# Patient Record
Sex: MALE | Race: WHITE | Employment: FULL TIME | URBAN - METROPOLITAN AREA
[De-identification: names, ages, dates, MRNs, and addresses within clinical notes are randomized per-mention and may not be internally consistent; named-entity substitution may affect disease eponyms.]

---

## 2020-03-09 ENCOUNTER — HOSPITAL ENCOUNTER (INPATIENT)
Age: 65
LOS: 1 days | Discharge: HOME | DRG: 300 | End: 2020-03-12
Attending: EMERGENCY MEDICINE | Admitting: INTERNAL MEDICINE
Payer: COMMERCIAL

## 2020-03-09 PROBLEM — R22.42 LOCALIZED SWELLING OF LEFT LOWER EXTREMITY: Status: ACTIVE | Noted: 2020-03-09

## 2020-03-09 LAB
ANION GAP SERPL CALCULATED.3IONS-SCNC: 11 MMOL/L (ref 3–16)
BASOPHILS ABSOLUTE: 0.1 K/UL (ref 0–0.2)
BASOPHILS RELATIVE PERCENT: 0.6 %
BUN BLDV-MCNC: 16 MG/DL (ref 7–20)
C-REACTIVE PROTEIN: 47.1 MG/L (ref 0–5.1)
CALCIUM SERPL-MCNC: 9.1 MG/DL (ref 8.3–10.6)
CHLORIDE BLD-SCNC: 96 MMOL/L (ref 99–110)
CO2: 25 MMOL/L (ref 21–32)
CREAT SERPL-MCNC: 1.1 MG/DL (ref 0.8–1.3)
EOSINOPHILS ABSOLUTE: 0.4 K/UL (ref 0–0.6)
EOSINOPHILS RELATIVE PERCENT: 4.7 %
GFR AFRICAN AMERICAN: >60
GFR NON-AFRICAN AMERICAN: >60
GLUCOSE BLD-MCNC: 119 MG/DL (ref 70–99)
HCT VFR BLD CALC: 48.3 % (ref 40.5–52.5)
HEMOGLOBIN: 15.9 G/DL (ref 13.5–17.5)
LYMPHOCYTES ABSOLUTE: 0.8 K/UL (ref 1–5.1)
LYMPHOCYTES RELATIVE PERCENT: 9 %
MCH RBC QN AUTO: 28.6 PG (ref 26–34)
MCHC RBC AUTO-ENTMCNC: 32.9 G/DL (ref 31–36)
MCV RBC AUTO: 86.9 FL (ref 80–100)
MONOCYTES ABSOLUTE: 1 K/UL (ref 0–1.3)
MONOCYTES RELATIVE PERCENT: 11.2 %
NEUTROPHILS ABSOLUTE: 7 K/UL (ref 1.7–7.7)
NEUTROPHILS RELATIVE PERCENT: 74.5 %
PDW BLD-RTO: 14.2 % (ref 12.4–15.4)
PLATELET # BLD: 403 K/UL (ref 135–450)
PMV BLD AUTO: 7.9 FL (ref 5–10.5)
POTASSIUM REFLEX MAGNESIUM: 5.8 MMOL/L (ref 3.5–5.1)
POTASSIUM SERPL-SCNC: 4.3 MMOL/L (ref 3.5–5.1)
RBC # BLD: 5.56 M/UL (ref 4.2–5.9)
SEDIMENTATION RATE, ERYTHROCYTE: 2 MM/HR (ref 0–20)
SODIUM BLD-SCNC: 132 MMOL/L (ref 136–145)
WBC # BLD: 9.4 K/UL (ref 4–11)

## 2020-03-09 PROCEDURE — 99285 EMERGENCY DEPT VISIT HI MDM: CPT

## 2020-03-09 PROCEDURE — 80048 BASIC METABOLIC PNL TOTAL CA: CPT

## 2020-03-09 PROCEDURE — 86140 C-REACTIVE PROTEIN: CPT

## 2020-03-09 PROCEDURE — 87040 BLOOD CULTURE FOR BACTERIA: CPT

## 2020-03-09 PROCEDURE — 2580000003 HC RX 258: Performed by: STUDENT IN AN ORGANIZED HEALTH CARE EDUCATION/TRAINING PROGRAM

## 2020-03-09 PROCEDURE — G0378 HOSPITAL OBSERVATION PER HR: HCPCS

## 2020-03-09 PROCEDURE — 96365 THER/PROPH/DIAG IV INF INIT: CPT

## 2020-03-09 PROCEDURE — 85652 RBC SED RATE AUTOMATED: CPT

## 2020-03-09 PROCEDURE — 85025 COMPLETE CBC W/AUTO DIFF WBC: CPT

## 2020-03-09 PROCEDURE — 84132 ASSAY OF SERUM POTASSIUM: CPT

## 2020-03-09 PROCEDURE — 6360000002 HC RX W HCPCS: Performed by: STUDENT IN AN ORGANIZED HEALTH CARE EDUCATION/TRAINING PROGRAM

## 2020-03-09 RX ORDER — SODIUM CHLORIDE 0.9 % (FLUSH) 0.9 %
10 SYRINGE (ML) INJECTION PRN
Status: DISCONTINUED | OUTPATIENT
Start: 2020-03-09 | End: 2020-03-12 | Stop reason: HOSPADM

## 2020-03-09 RX ORDER — ACETAMINOPHEN 325 MG/1
650 TABLET ORAL EVERY 6 HOURS PRN
Status: DISCONTINUED | OUTPATIENT
Start: 2020-03-09 | End: 2020-03-12 | Stop reason: HOSPADM

## 2020-03-09 RX ORDER — ONDANSETRON 2 MG/ML
4 INJECTION INTRAMUSCULAR; INTRAVENOUS EVERY 6 HOURS PRN
Status: DISCONTINUED | OUTPATIENT
Start: 2020-03-09 | End: 2020-03-12 | Stop reason: HOSPADM

## 2020-03-09 RX ORDER — PROMETHAZINE HYDROCHLORIDE 25 MG/1
12.5 TABLET ORAL EVERY 6 HOURS PRN
Status: DISCONTINUED | OUTPATIENT
Start: 2020-03-09 | End: 2020-03-12 | Stop reason: HOSPADM

## 2020-03-09 RX ORDER — SODIUM CHLORIDE 0.9 % (FLUSH) 0.9 %
10 SYRINGE (ML) INJECTION EVERY 12 HOURS SCHEDULED
Status: DISCONTINUED | OUTPATIENT
Start: 2020-03-09 | End: 2020-03-12 | Stop reason: HOSPADM

## 2020-03-09 RX ORDER — ACETAMINOPHEN 650 MG/1
650 SUPPOSITORY RECTAL EVERY 6 HOURS PRN
Status: DISCONTINUED | OUTPATIENT
Start: 2020-03-09 | End: 2020-03-12 | Stop reason: HOSPADM

## 2020-03-09 RX ADMIN — VANCOMYCIN HYDROCHLORIDE 2000 MG: 10 INJECTION, POWDER, LYOPHILIZED, FOR SOLUTION INTRAVENOUS at 22:00

## 2020-03-09 ASSESSMENT — PAIN DESCRIPTION - LOCATION: LOCATION: LEG

## 2020-03-09 ASSESSMENT — PAIN DESCRIPTION - PAIN TYPE: TYPE: ACUTE PAIN

## 2020-03-09 ASSESSMENT — PAIN DESCRIPTION - DESCRIPTORS: DESCRIPTORS: DISCOMFORT

## 2020-03-09 ASSESSMENT — PAIN - FUNCTIONAL ASSESSMENT: PAIN_FUNCTIONAL_ASSESSMENT: ACTIVITIES ARE NOT PREVENTED

## 2020-03-09 ASSESSMENT — PAIN SCALES - GENERAL: PAINLEVEL_OUTOF10: 3

## 2020-03-09 ASSESSMENT — PAIN DESCRIPTION - PROGRESSION: CLINICAL_PROGRESSION: NOT CHANGED

## 2020-03-09 ASSESSMENT — PAIN DESCRIPTION - FREQUENCY: FREQUENCY: INTERMITTENT

## 2020-03-09 ASSESSMENT — PAIN DESCRIPTION - ORIENTATION: ORIENTATION: LEFT

## 2020-03-09 ASSESSMENT — PAIN DESCRIPTION - ONSET: ONSET: ON-GOING

## 2020-03-10 PROBLEM — L20.9 ATOPIC ECZEMA: Status: ACTIVE | Noted: 2020-03-10

## 2020-03-10 PROBLEM — L03.116 LEFT LEG CELLULITIS: Status: ACTIVE | Noted: 2020-03-10

## 2020-03-10 LAB
ANION GAP SERPL CALCULATED.3IONS-SCNC: 9 MMOL/L (ref 3–16)
BUN BLDV-MCNC: 16 MG/DL (ref 7–20)
CALCIUM SERPL-MCNC: 8.4 MG/DL (ref 8.3–10.6)
CHLORIDE BLD-SCNC: 100 MMOL/L (ref 99–110)
CO2: 25 MMOL/L (ref 21–32)
CREAT SERPL-MCNC: 0.9 MG/DL (ref 0.8–1.3)
GFR AFRICAN AMERICAN: >60
GFR NON-AFRICAN AMERICAN: >60
GLUCOSE BLD-MCNC: 118 MG/DL (ref 70–99)
HCT VFR BLD CALC: 43.8 % (ref 40.5–52.5)
HEMOGLOBIN: 14.5 G/DL (ref 13.5–17.5)
MCH RBC QN AUTO: 28.6 PG (ref 26–34)
MCHC RBC AUTO-ENTMCNC: 33 G/DL (ref 31–36)
MCV RBC AUTO: 86.5 FL (ref 80–100)
PDW BLD-RTO: 13.8 % (ref 12.4–15.4)
PLATELET # BLD: 356 K/UL (ref 135–450)
PMV BLD AUTO: 7.6 FL (ref 5–10.5)
POTASSIUM REFLEX MAGNESIUM: 4.3 MMOL/L (ref 3.5–5.1)
RBC # BLD: 5.06 M/UL (ref 4.2–5.9)
SODIUM BLD-SCNC: 134 MMOL/L (ref 136–145)
WBC # BLD: 8.8 K/UL (ref 4–11)

## 2020-03-10 PROCEDURE — 96372 THER/PROPH/DIAG INJ SC/IM: CPT

## 2020-03-10 PROCEDURE — 96367 TX/PROPH/DG ADDL SEQ IV INF: CPT

## 2020-03-10 PROCEDURE — G0378 HOSPITAL OBSERVATION PER HR: HCPCS

## 2020-03-10 PROCEDURE — 85027 COMPLETE CBC AUTOMATED: CPT

## 2020-03-10 PROCEDURE — 36415 COLL VENOUS BLD VENIPUNCTURE: CPT

## 2020-03-10 PROCEDURE — 97116 GAIT TRAINING THERAPY: CPT

## 2020-03-10 PROCEDURE — 99254 IP/OBS CNSLTJ NEW/EST MOD 60: CPT | Performed by: INTERNAL MEDICINE

## 2020-03-10 PROCEDURE — 97161 PT EVAL LOW COMPLEX 20 MIN: CPT

## 2020-03-10 PROCEDURE — 96376 TX/PRO/DX INJ SAME DRUG ADON: CPT

## 2020-03-10 PROCEDURE — 96366 THER/PROPH/DIAG IV INF ADDON: CPT

## 2020-03-10 PROCEDURE — 6360000002 HC RX W HCPCS: Performed by: INTERNAL MEDICINE

## 2020-03-10 PROCEDURE — 2580000003 HC RX 258: Performed by: INTERNAL MEDICINE

## 2020-03-10 PROCEDURE — 80048 BASIC METABOLIC PNL TOTAL CA: CPT

## 2020-03-10 PROCEDURE — 96375 TX/PRO/DX INJ NEW DRUG ADDON: CPT

## 2020-03-10 PROCEDURE — 6370000000 HC RX 637 (ALT 250 FOR IP): Performed by: INTERNAL MEDICINE

## 2020-03-10 RX ORDER — TRIAMCINOLONE ACETONIDE 5 MG/G
CREAM TOPICAL 2 TIMES DAILY
Status: DISCONTINUED | OUTPATIENT
Start: 2020-03-10 | End: 2020-03-10

## 2020-03-10 RX ORDER — DIPHENHYDRAMINE HYDROCHLORIDE 50 MG/ML
25 INJECTION INTRAMUSCULAR; INTRAVENOUS EVERY 6 HOURS PRN
Status: DISCONTINUED | OUTPATIENT
Start: 2020-03-10 | End: 2020-03-12 | Stop reason: HOSPADM

## 2020-03-10 RX ORDER — DIPHENHYDRAMINE HYDROCHLORIDE 50 MG/ML
25 INJECTION INTRAMUSCULAR; INTRAVENOUS ONCE
Status: COMPLETED | OUTPATIENT
Start: 2020-03-10 | End: 2020-03-10

## 2020-03-10 RX ADMIN — VANCOMYCIN HYDROCHLORIDE 1250 MG: 10 INJECTION, POWDER, LYOPHILIZED, FOR SOLUTION INTRAVENOUS at 23:09

## 2020-03-10 RX ADMIN — CEFAZOLIN SODIUM 2 G: 10 INJECTION, POWDER, FOR SOLUTION INTRAVENOUS at 17:15

## 2020-03-10 RX ADMIN — DIPHENHYDRAMINE HYDROCHLORIDE 25 MG: 50 INJECTION, SOLUTION INTRAMUSCULAR; INTRAVENOUS at 04:46

## 2020-03-10 RX ADMIN — ENOXAPARIN SODIUM 40 MG: 40 INJECTION SUBCUTANEOUS at 08:47

## 2020-03-10 RX ADMIN — Medication 10 ML: at 10:59

## 2020-03-10 RX ADMIN — DIPHENHYDRAMINE HYDROCHLORIDE 25 MG: 50 INJECTION, SOLUTION INTRAMUSCULAR; INTRAVENOUS at 02:34

## 2020-03-10 RX ADMIN — VANCOMYCIN HYDROCHLORIDE 1250 MG: 10 INJECTION, POWDER, LYOPHILIZED, FOR SOLUTION INTRAVENOUS at 10:58

## 2020-03-10 RX ADMIN — TRIAMCINOLONE ACETONIDE: 1 OINTMENT TOPICAL at 21:21

## 2020-03-10 ASSESSMENT — PAIN SCALES - GENERAL: PAINLEVEL_OUTOF10: 0

## 2020-03-10 NOTE — ED NOTES
Pt calm and resting quietly with equal rise and fall of chest. Pt in NAD, RR even and unlabored. Side rails up, bed locked and in lowest position. Pt updated on plan of care. No needs at this time. Pt instructed on use of call light if needed.       Boy Patino RN  03/09/20 5141

## 2020-03-10 NOTE — ED PROVIDER NOTES
Consideration of SJS/JAN in versus staph scalded skin was made, but the patient's vital signs and long evolution argue against these emergent diagnoses. Patient denies having any hardware, but underlying osteomyelitis is also a consideration given the duration. He does not have considerable pain and given the length of evolution, I believe that a necrotizing infection is unlikely. I also considered the possibility of cerulea phlegmasia dolerans patient. He informed us that he had a previous duplex study at an outside hospital during the course of this disease that was negative. At this time basic laboratory test including CBC, BMP, ESR, CRP are being pulled, I am pulling blood cultures x2. Testing was elevated but hemolyzed, and was resent. In addition I am starting the patient empirically on vancomycin and admitting the patient to the hospitalish for further evaluation. ED Course as of Mar 09 2245   Mon Mar 09, 2020   2040 BP(!): 151/76 [BB]   2040 Pulse: 92 [BB]   2040 Resp: 16 [BB]   2040 SpO2: 100 % [BB]   2040 Temp: 97.8 °F (36.6 °C) [BB]   2040 MAP (mmHg): 98 [BB]   2147 BP(!): 151/76 [BB]   2147 Pulse: 92 [BB]   2147 Resp: 16 [BB]   2147 Temp: 97.8 °F (36.6 °C) [BB]   2147 SpO2: 100 % [BB]   2157 BP(!): 151/76 [BB]   2158 Pulse: 92 [BB]   2158 Resp: 16 [BB]   2158 SpO2: 100 % [BB]   2158 Temp: 97.8 °F (36.6 °C) [BB]   2218 WBC: 9.4 [BB]   2219 BP: 134/73 [BB]   2219 SpO2: 99 % [BB]      ED Course User Index  [BB] Drake Hernandez MD       This patient was also evaluated by the attending physician. All care plans werediscussed and agreed upon. Clinical Impression     1. Rash and other nonspecific skin eruption        Disposition     PATIENT REFERRED TO:  No follow-up provider specified.     DISCHARGE MEDICATIONS:  New Prescriptions    No medications on file       Sadie Ruiz MD  Resident  03/09/20 1977

## 2020-03-10 NOTE — CONSULTS
Clinical Pharmacy Consult Note    Admit date: 3/9/2020    Subjective/Objective:    61 yo male with no significant past medical history on file presented with complaint of localized left lower extremity swelling with pain and rash which has worsened over the past 3 to 4 weeks after failure of outpatient antibiotic therapy obtained from a local Urgent Care. Due to concern for suspected cellulitis versus allergic ischemia eruption, Pharmacy is consulted to empirically dose Vancomycin per Dr. Nico Aceves. Pertinent Medications:  Vancomycin IV; Pharmacy to dose -- day # 2   2000mg IV x1 (3/9 2200)   1250mg IV q12h (3/10- current)    PERTINENT LABS:  Recent Labs     03/09/20 2146 03/09/20  2249 03/10/20  0609   *  --  134*   K 5.8* 4.3 4.3   CL 96*  --  100   CO2 25  --  25   BUN 16  --  16   CREATININE 1.1  --  0.9       Estimated Creatinine Clearance: 87 mL/min (based on SCr of 0.9 mg/dL). Recent Labs     03/09/20  2146 03/10/20  0609   WBC 9.4 8.8   HGB 15.9 14.5   HCT 48.3 43.8   MCV 86.9 86.5    356       Height:  5' 7\" (170.2 cm)  Weight: 185 lb 6.5 oz (84.1 kg)    Micro:  Date Site Micro Susceptibility   3/9/20 Blood Pending                    Assessment/Plan:  Vancomycin  · Indication: cellulitis versus allergic ischemia eruption  · Trough goal 10-15  · Agree with current regimen of 1250mg IV q12h   · Patient developed itching and flushing with 2000mg dose on 3/9, will modify infusion rate to be given over 2 hours. PRN benadryl ordered q6h   · Will obtain trough tomorrow 3/11 prior to 4th dose   · Renal function will be monitored closely and dosing will be adjusted as appropriate. Please call with any questions. Thank you for consulting pharmacy!   Adam Woodruff, PharmD, [address] [place] [address]: [phone]  [address] [place] [address] [employer]: [phone]  3/10/2020 10:00 AM

## 2020-03-10 NOTE — PROGRESS NOTES
4 Eyes Admission Assessment     I agree as the admission nurse that 2 RN's have performed a thorough Head to Toe Skin Assessment on the patient. ALL assessment sites listed below have been assessed on admission. Areas assessed by both nurses:   [x]   Head, Face, and Ears   [x]   Shoulders, Back, and Chest  [x]   Arms, Elbows, and Hands   [x]   Coccyx, Sacrum, and Ischum  [x]   Legs, Feet, and Heels        Does the Patient have Skin Breakdown?   No  Rash to BUE/Chest/Abdomen/RLE  Redness/swelling to LLE        Micah Prevention initiated:  Yes   Wound Care Orders initiated:  NA      [facility] nurse consulted for Pressure Injury (Stage 3,4, Unstageable, DTI, NWPT, and Complex wounds):  NA      Nurse 1 eSignature: Electronically signed by Geronimo Giron RN on 3/10/20 at 2:07 AM EDT    **SHARE this note so that the co-signing nurse is able to place an eSignature**    Nurse 2 eSignature: Electronically signed by Chuck Nicholas RN on 3/10/20 at 2:08 AM EDT

## 2020-03-10 NOTE — CONSULTS
Clinical Pharmacy Consult Note    Admit date: 3/9/2020    Subjective/Objective:    61 yo male with no significant past medical history on file presented with complaint of localized left lower extremity swelling with pain and rash which has worsened over the past 3 to 4 weeks after failure of outpatient antibiotic therapy obtained from a local Urgent Care. Due to concern for suspected cellulitis versus allergic ischemia eruption, Pharmacy is consulted to empirically dose Vancomycin per Dr. Ashia Lu. Pertinent Medications:  Vancomycin IV; Pharmacy to dose -- day # 1    PERTINENT LABS:  Recent Labs     03/09/20 2146 03/09/20  2249   *  --    K 5.8* 4.3   CL 96*  --    CO2 25  --    BUN 16  --    CREATININE 1.1  --        Estimated Creatinine Clearance: 67 mL/min (based on SCr of 1.1 mg/dL). Recent Labs     03/09/20 2146   WBC 9.4   HGB 15.9   HCT 48.3   MCV 86.9          Height:  5' 6\" (167.6 cm)  Weight: 171 lb 11.2 oz (77.9 kg)    Micro:  Date Site Micro Susceptibility   3/9/20 Blood Pending                    Assessment/Plan:  Vancomycin  · Prior to this consult, patient received a dose of vancomycin IV 2000 mg. Will continue therapy with vancomycin 1250 mg every 12 hours (~ 15 mg/kg)  · Clinical pharmacist will follow-up in AM.  · Renal function will be monitored closely and dosing will be adjusted as appropriate. Please call with any questions. Thank you for consulting pharmacy!   Lorenzo Jensen Rph  3/9/2020 11:22 PM

## 2020-03-10 NOTE — PROGRESS NOTES
Patient admitted to room 5511 at [phone] from ED. Patient is a/o x4. Patient denies complaints of nausea/pain. Non-skid socks given. Oriented patient to room and call light. Bed locked and in lowest positioned. Bedside table, personal belongings, and nurse call light within reach. Instructed patient to utilize call light for assistance. Bed alarm on. Will continue to monitor.

## 2020-03-10 NOTE — CARE COORDINATION
8: 30am-5pm  2. Prescription(s) must be in pharmacy by 3pm to be filled same day  3. Copy of patient's insurance/ prescription drug card and patient face sheet must be sent along with the prescription(s)  4. Cost of Rx cannot be added to hospital bill. If financial assistance is needed, please contact unit  or ;  or  CANNOT provide pharmacy voucher for patients co-pays  5. Patients can then  the prescription on their way out of the hospital at discharge, or pharmacy can deliver to the bedside if staff is available. (payment due at time of pick-up or delivery - cash, check, or card accepted)     Able to afford home medications/ co-pay costs: Yes    ADLS  Support Systems: Spouse/Significant Other, Children    PT AM-PAC: 22 /24  OT AM-PAC:   /24    [place]: one level apartment  Steps: 2 flights    Plans to RETURN to current housing: Yes  Barriers to RETURNING to current housing: none noted    DISCHARGE PLAN:  Disposition: Home- No Services Needed    Transportation PLAN for discharge: self     Factors facilitating achievement of predicted outcomes: Cooperative and Pleasant    Barriers to discharge: awaiting dermatology    Additional Case Management Notes: NA    The Plan for Transition of Care is related to the following treatment goals of Localized swelling of left lower extremity [R22.42]  Localized swelling of left lower extremity [R22.42]    The Patient and/or patient representative Flores Tinoco and his family were provided with a choice of provider and agrees with the discharge plan Not Indicated    Freedom of choice list was provided with basic dialogue that supports the patient's individualized plan of care/goals and shares the quality data associated with the providers.  Not Indicated      Care Transition patient: No    Rebecca Glynn RN  [facility] [employer]  Case Management Department  Ph: [phone]   Fax: [phone]

## 2020-03-10 NOTE — CONSULTS
Penicillins    Social History:    TOBACCO:    None   ETOH:    Occasional   DRUGS:   None   MARITAL STATUS:      OCCUPATION:   Unknown     Family History:   No immunodeficiency    REVIEW OF SYSTEMS:    No fever / chills / sweats. No weight loss. No visual change, eye pain, eye discharge. No oral lesion, sore throat, dysphagia. Denies cough / sputum. Denies chest pain, palpitations. Denies n / v / abd pain. No diarrhea. Denies dysuria or change in urinary function. Left lower extremity swelling, rash. B/l upper extremity rash. Denies focal weakness, sensory change or other neurologic symptom  No lymph node swelling or tenderness. Tolerating antibiotics. PHYSICAL EXAM:      Vitals:    /63   Pulse 81   Temp 98.7 °F (37.1 °C) (Oral)   Resp 16   Ht 5' 7\" (1.702 m)   Wt 185 lb 6.5 oz (84.1 kg)   SpO2 99%   BMI 29.04 kg/m²     GENERAL: No apparent distress. HEENT: Membranes moist, no conjunctival changes, no oral lesion  NECK:  Supple, no masses  LYMPH: No adenopathy   LUNGS: Clear b/l, no rales, no dullness  CARDIAC: RRR, no murmur appreciated  ABD:  + BS, soft / NT, no masses  EXT:  LLE: Swollen, erythematous, scaling. No wounds. Full ROM. RLE: Maculopapular rash on anterior thigh    R/L UE: Scaling erythematous rash in antecubital spaces. No   wounds. Full ROM.   NEURO: No focal neurologic findings  PSYCH: Orientation, sensorium, mood normal  LINES:      DATA:    Lab Results   Component Value Date    WBC 8.8 03/10/2020    HGB 14.5 03/10/2020    HCT 43.8 03/10/2020    MCV 86.5 03/10/2020     03/10/2020     Lab Results   Component Value Date    CREATININE 0.9 03/10/2020    BUN 16 03/10/2020     (L) 03/10/2020    K 4.3 03/10/2020     03/10/2020    CO2 25 03/10/2020       Micro:  3/9 BC sent    Imaging:   None     IMPRESSION:      Patient Active Problem List   Diagnosis    Localized swelling of left lower extremity RECOMMENDATIONS:  Cellulitis  -Cellulitis likely superimposed on underlying dermatological disease  -Continue Vancomycin  -Add Cefazolin  -Dermatology consulted    Discussed with pt  Dr. Jackie Sibley    Addendum to Resident Consult Note:  Pt seen, examined and evaluated. I have reviewed the current history, physical findings, labs and assessment and plan and agree with note as documented by resident (Dr. Akosua Perez). 61 yo man with no previous established medical care - no dx / meds  Hx rash as child   Intermittent flare of rash, involves flexure area of arm    Strained L leg in Oct, developed erythema, worse recently. Seen at Urgent Care, 'little better' with topical steroid and antibiotic. Worsened, return to Urgent Care and referred to [facility] ED. At [facility] 3/9 - afebrile, WBC 9.4. Admit, started on vancomycin    Upper extremity      Lower extremity        IMP/  Probable underlying atopic dermatitis  L leg cellulitis, poss Streptococcal    REC/  Cont vancomycin  Start cefazolin  Confer with Derm   - topical steroid   - wet wrap, UV light, systemic therapy (cyclosporin, methotrexate, azathiaprine)    Needs f/u with Derm  Discussed with pt   Brenda Vergara MD     ADDENDUM:  Ashley Man, Dr Kerry Ramirez. He reviewed images in Epic. Agrees with dx.   Recommends triamcinolone ointment bid   Pt can f/u with him after discharge  Brenda Vergara MD

## 2020-03-10 NOTE — PROGRESS NOTES
Physical Therapy    Facility/Department: [facility] 5T ORTHO/NEURO  Initial Assessment and Treatment/Discharge    NAME: Lucy Anderson  : 1955  MRN: [id number]    Date of Service: 3/10/2020    Discharge Recommendations:Daniel Dennis scored a 22/24 on the AM-PAC short mobility form. Current research shows that an AM-PAC score of 18 or greater is typically associated with a discharge to the patient's home setting. Based on the patients AM-PAC score and their current functional mobility deficits, it is recommended that the patient have 2-3 sessions per week of Physical Therapy at d/c to increase the patients independence. SEE ASSESSMENT BELOW    If patient discharges prior to next session this note will serve as a discharge summary. Please see below for the latest assessment towards goals. PT Equipment Recommendations  Equipment Needed: No    Assessment   Assessment: Pt demonstrating safe mobility without AD with supervision. LLE ROM limited by pain and swelling. Plans to go home at d/c and has no concerns about managing. Pt instructed on performing ROM LLE and to ambulate while in hospital to maintain independence - pt verbalized understanding. No further PT needs identified at this time  Decision Making: Low Complexity  PT Education: Goals;PT Role;Plan of Care;General Safety; Functional Mobility Training  Patient Education: Pt verbalized understanding  REQUIRES PT FOLLOW UP: No       Patient Diagnosis(es): The encounter diagnosis was Rash and other nonspecific skin eruption. has no past medical history on file. has no past surgical history on file. Restrictions  Position Activity Restriction  Other position/activity restrictions: up as tolerated  Vision/Hearing  Vision: Within Functional Limits  Hearing: Within functional limits     Subjective  General  Chart Reviewed: Yes  Additional Pertinent Hx: Pt is a 62 y.o. male adm 3/9 with swelling LLE.   Presented to ED with a 4-month evolution of a left lower leg swelling and redness. No significant PMH  Referral Date : 03/09/20  Subjective  Subjective: Pt found supine. Agreeable to PT  Pain Screening  Patient Currently in Pain: Yes(LLE, not rated, RN aware)  Vital Signs  Patient Currently in Pain: Yes(LLE, not rated, RN aware)       Orientation  Orientation  Overall Orientation Status: Within Functional Limits  Social/Functional History  Social/Functional History  Lives With: Alone(wife lives in the [place])  Type of Home: Apartment  Home Layout: One level  Home Access: Stairs to enter with rails(2 flights of steps)  Bathroom Shower/Tub: Tub/Shower unit  Bathroom Toilet: Standard(toilet is between tub and sink)  Home Equipment: (none)  ADL Assistance: Independent  Homemaking Assistance: Independent  Ambulation Assistance: Independent  Transfer Assistance: Independent  Active : Yes  Type of occupation:  at [employer]  Additional Comments: Has been having trouble with LLE since November. Denies falls  Cognition        Objective          AROM RLE (degrees)  RLE AROM: WFL  AROM LLE (degrees)  LLE AROM : WFL(grossly decreased 2/2 swelling but WFL - knee flexion to approximately 90 degrees)  LLE General AROM: swollen, erythematous, warm left leg from the base of the toes through the upper thigh  Strength RLE  Strength RLE: WFL  Strength LLE  Strength LLE: WFL        Bed mobility  Supine to Sit: Supervision(HOB elevated)  Transfers  Sit to Stand: Supervision  Stand to sit: Supervision  Ambulation  Ambulation?: Yes  Ambulation 1  Device: No Device  Assistance: Supervision  Quality of Gait: L knee flexed with heel off floor in stance phase, decreased stance time LLE, steady, decreased davis  Distance: 30', 20'   Stairs/Curb  Stairs?: (discussed technique on steps - pt reports this is what he has been doing, has no concerns about managing)        Exercises  Comments: Discussed performing ROM exercises LLE hip/knee/ankle. Pt verbalized understanding   Treatment included: bed mobility, transfers, gt, pt education    Plan   Plan  Times per week: D/C PT   Safety Devices  Type of devices: Call light within reach, Chair alarm in place, Nurse notified, Left in chair    G-Code       OutComes Score                                                  AM-PAC Score  AM-PAC Inpatient Mobility Raw Score : 22 (03/10/20 1148)  AM-PAC Inpatient T-Scale Score : 53.28 (03/10/20 1148)  Mobility Inpatient CMS 0-100% Score: 20.91 (03/10/20 1148)  Mobility Inpatient CMS G-Code Modifier : Hansksenia Moses (03/10/20 1148)          Goals  Short term goals  Time Frame for Short term goals: No goals set - pt supervision with mobility       Therapy Time   Individual Concurrent Group Co-treatment   Time In 1129         Time Out 1153         Minutes 24              Timed Code Treatment Minutes: 9      Total Treatment Minutes:  550 Sonja Ma, PT

## 2020-03-10 NOTE — H&P
Hospital Medicine History & Physical      PCP: No primary care provider on file. Date of Admission: 3/9/2020    Chief Complaint:  LLE rash      History Of Present Illness:  Patient is a 46-year-old male who denies any past medical history presents to the hospital for 4-month left lower extremity swelling and redness. According to the patient he does not visit doctors and he thought it would resolve on its own. He mentions meanwhile he visited urgent care once and received antibiotics but his rash has continued to get worse and now associated with pain. He mentions his rash has gotten bigger in size. He denies associated fever chest pain shortness of breath nausea vomiting chills. Past Medical History:      No past medical history on file. Past Surgical History:      No past surgical history on file. Medications Prior to Admission:      Prior to Admission medications    Not on File       Allergies:  Penicillins    Social History:      TOBACCO:   reports that he has never smoked. He has never used smokeless tobacco.  ETOH:   has no history on file for alcohol. Family History:       M - HTN      No family history on file. REVIEW OF SYSTEMS:   Pertinent positives as noted in the HPI. All other systems reviewed and negative. PHYSICAL EXAM PERFORMED:    /73   Pulse 85   Temp 98.4 °F (36.9 °C) (Oral)   Resp 16   Ht 5' 7\" (1.702 m)   Wt 185 lb 6.5 oz (84.1 kg)   SpO2 100%   BMI 29.04 kg/m²     General appearance:  No apparent distress, cooperative. HEENT:  Normal cephalic, atraumatic without obvious deformity. Conjunctivae/corneas clear. Neck: Supple, with full range of motion. No cervical lymphadenopathy  Respiratory:  Normal respiratory effort. Clear to auscultation, bilaterally without Rales/Wheezes/Rhonchi. Cardiovascular:  Regular rate and rhythm with normal S1/S2 without murmurs, rubs or gallops.   Abdomen: Soft, non-tender, non-distended, normal bowel sounds. Musculoskeletal:  No edema bilaterally. No tenderness on palpation   Skin: Diffuse circumferential left lower extremity rash, he also has rash on bilateral flexor upper extremities  Neurologic:  Neurologically intact without any focal sensory/motor deficits. grossly non-focal.  Psychiatric:  Alert and oriented, normal mood  Peripheral Pulses: +2 palpable, equal bilaterally       Labs:     Recent Labs     03/09/20 2146   WBC 9.4   HGB 15.9   HCT 48.3        Recent Labs     03/09/20 2146 03/09/20  2249   *  --    K 5.8* 4.3   CL 96*  --    CO2 25  --    BUN 16  --    CREATININE 1.1  --    CALCIUM 9.1  --      No results for input(s): AST, ALT, BILIDIR, BILITOT, ALKPHOS in the last 72 hours. No results for input(s): INR in the last 72 hours. No results for input(s): Rubin Cowan in the last 72 hours. Urinalysis:    No results found for: Domenica Flores, BACTERIA, [address] [facility], [facility], [facility], [facility] 994    Radiology:       No orders to display           Active Hospital Problems    Diagnosis Date Noted    Localized swelling of left lower extremity [R22.42] 03/09/2020     Patient is a 50-year-old male who denies any past medical history presents to the hospital for 4-month left lower extremity swelling and redness. According to the patient he does not visit doctors and he thought it would resolve on its own. He mentions meanwhile he visited urgent care once and received antibiotics but his rash has continued to get worse and now associated with pain. He mentions his rash has gotten bigger in size. He denies associated fever chest pain shortness of breath nausea vomiting chills.     Assessment  Left lower extremity rash secondary to unclear etiology, suspected cellulitis versus allergic ischemia eruption  Elevated CRP  Hyponatremia    Plan  We will start empirical IV vancomycin, consider ID consult if no improvement, consider dermatology consult  IV fluid hydration  Continue to monitor CBC, BMP  Blood cultures were ordered from ED  DVT Prophylaxis: lovenox  Diet: DIET GENERAL;  Code Status: Full Code    PT/OT Eval Status: ordered for one time eval    Dispo - 24-48hrs, home       James Lloyd MD    Thank you No primary care provider on file. for the opportunity to be involved in this patient's care. If you have any questions or concerns please feel free to contact me at [phone].

## 2020-03-11 ENCOUNTER — APPOINTMENT (OUTPATIENT)
Dept: VASCULAR LAB | Age: 65
DRG: 300 | End: 2020-03-11
Payer: COMMERCIAL

## 2020-03-11 LAB — VANCOMYCIN TROUGH: 12.8 UG/ML (ref 10–20)

## 2020-03-11 PROCEDURE — 93971 EXTREMITY STUDY: CPT

## 2020-03-11 PROCEDURE — 96372 THER/PROPH/DIAG INJ SC/IM: CPT

## 2020-03-11 PROCEDURE — 6360000002 HC RX W HCPCS: Performed by: INTERNAL MEDICINE

## 2020-03-11 PROCEDURE — G0378 HOSPITAL OBSERVATION PER HR: HCPCS

## 2020-03-11 PROCEDURE — 80202 ASSAY OF VANCOMYCIN: CPT

## 2020-03-11 PROCEDURE — 96366 THER/PROPH/DIAG IV INF ADDON: CPT

## 2020-03-11 PROCEDURE — 2580000003 HC RX 258: Performed by: INTERNAL MEDICINE

## 2020-03-11 PROCEDURE — 6370000000 HC RX 637 (ALT 250 FOR IP): Performed by: INTERNAL MEDICINE

## 2020-03-11 PROCEDURE — 99232 SBSQ HOSP IP/OBS MODERATE 35: CPT | Performed by: INTERNAL MEDICINE

## 2020-03-11 PROCEDURE — 36415 COLL VENOUS BLD VENIPUNCTURE: CPT

## 2020-03-11 RX ADMIN — CEFAZOLIN SODIUM 2 G: 10 INJECTION, POWDER, FOR SOLUTION INTRAVENOUS at 23:55

## 2020-03-11 RX ADMIN — TRIAMCINOLONE ACETONIDE: 1 OINTMENT TOPICAL at 08:36

## 2020-03-11 RX ADMIN — ENOXAPARIN SODIUM 40 MG: 40 INJECTION SUBCUTANEOUS at 08:33

## 2020-03-11 RX ADMIN — CEFAZOLIN SODIUM 2 G: 10 INJECTION, POWDER, FOR SOLUTION INTRAVENOUS at 16:56

## 2020-03-11 RX ADMIN — CEFAZOLIN SODIUM 2 G: 10 INJECTION, POWDER, FOR SOLUTION INTRAVENOUS at 03:40

## 2020-03-11 RX ADMIN — VANCOMYCIN HYDROCHLORIDE 1250 MG: 10 INJECTION, POWDER, LYOPHILIZED, FOR SOLUTION INTRAVENOUS at 12:20

## 2020-03-11 RX ADMIN — ENOXAPARIN SODIUM 80 MG: 80 INJECTION SUBCUTANEOUS at 22:14

## 2020-03-11 RX ADMIN — TRIAMCINOLONE ACETONIDE: 1 OINTMENT TOPICAL at 22:08

## 2020-03-11 RX ADMIN — Medication 10 ML: at 08:37

## 2020-03-11 RX ADMIN — CEFAZOLIN SODIUM 2 G: 10 INJECTION, POWDER, FOR SOLUTION INTRAVENOUS at 08:24

## 2020-03-11 RX ADMIN — Medication 10 ML: at 00:55

## 2020-03-11 RX ADMIN — Medication 10 ML: at 22:09

## 2020-03-11 ASSESSMENT — PAIN SCALES - GENERAL
PAINLEVEL_OUTOF10: 2
PAINLEVEL_OUTOF10: 0

## 2020-03-11 ASSESSMENT — PAIN DESCRIPTION - LOCATION: LOCATION: KNEE

## 2020-03-11 ASSESSMENT — PAIN DESCRIPTION - PAIN TYPE: TYPE: ACUTE PAIN

## 2020-03-11 NOTE — PROGRESS NOTES
Pt admitted to room 6313 from 3S. Pt alert and oriented. Up ad rod with steady gait. Call light in reach.

## 2020-03-11 NOTE — PROGRESS NOTES
He is alert and oriented to person, place, and time. Psychiatric:         Mood and Affect: Mood normal.         Behavior: Behavior normal.               Labs      Recent Labs     03/09/20  2146 03/10/20  0609   WBC 9.4 8.8   HGB 15.9 14.5   HCT 48.3 43.8    356   MCV 86.9 86.5        Recent Labs     03/09/20  2146 03/09/20  2249 03/10/20  0609   *  --  134*   K 5.8* 4.3 4.3   CL 96*  --  100   CO2 25  --  25   BUN 16  --  16   CREATININE 1.1  --  0.9       No results for input(s): AST, ALT, ALB, BILIDIR, BILITOT, ALKPHOS in the last 72 hours. No results for input(s): MG in the last 72 hours. Radiology  No orders to display           Assessment and Plan: Active Problems:    Localized swelling of left lower extremity    Atopic eczema    Left leg cellulitis    Rash and other nonspecific skin eruption  Resolved Problems:    * No resolved hospital problems. *       Left lower extremity cellulitis  Continue with IV antibiotics  Likely streptococcal infection. Infectious disease following. Venous dopplers. Atopic eczema  Continue with the topical steroid. Needs outpatient dermatology.           Joseph Magaña MD  3/11/2020

## 2020-03-11 NOTE — PROGRESS NOTES
magnesium hydroxide, promethazine **OR** ondansetron      Assessment:     Cellulitis of LLE likely superimposed on underlying atopic dermatitis      Plan:     Continue vanc/cefazolin  Continue triamcinolone ointment  Follow with Dermatology (Dr. Beto Wilkins) as outpatient    Discussed with   Fabienne Machuca MD    Addendum to Resident Progress Note:  Pt seen, examined and evaluated. I have reviewed the current history, physical findings, labs and assessment and plan and agree with note as documented by resident (Dr. Aditi Lara).    61 yo man with no previous established medical care - no dx / meds  Hx rash as child   Intermittent flare of rash, involves flexure area of arm     Strained L leg in Oct, developed erythema, worse recently. Seen at Urgent Care, 'little better' with topical steroid and antibiotic. Worsened, return to Urgent Care and referred to [facility] ED.     At [facility] 3/9 - afebrile, WBC 9.4.   Admit, started on vancomycin     Upper extremity - 3/10       Lower extremity - 3/10          IMP/  Probable underlying atopic dermatitis  L leg cellulitis, poss Streptococcal     REC/  Cont vancomycin + cefazolin  Topical steroid - triamcinolone oitment bid    F/u Derm post-discharge      Discussed with pt   Shital Chavez MD

## 2020-03-12 VITALS
TEMPERATURE: 97.9 F | DIASTOLIC BLOOD PRESSURE: 63 MMHG | HEIGHT: 67 IN | BODY MASS INDEX: 29.1 KG/M2 | WEIGHT: 185.41 LBS | HEART RATE: 67 BPM | OXYGEN SATURATION: 97 % | SYSTOLIC BLOOD PRESSURE: 117 MMHG | RESPIRATION RATE: 16 BRPM

## 2020-03-12 PROBLEM — L03.116 CELLULITIS OF LEFT LEG: Status: ACTIVE | Noted: 2020-03-12

## 2020-03-12 PROCEDURE — G0378 HOSPITAL OBSERVATION PER HR: HCPCS

## 2020-03-12 PROCEDURE — 2580000003 HC RX 258: Performed by: INTERNAL MEDICINE

## 2020-03-12 PROCEDURE — 6360000002 HC RX W HCPCS: Performed by: INTERNAL MEDICINE

## 2020-03-12 PROCEDURE — 96366 THER/PROPH/DIAG IV INF ADDON: CPT

## 2020-03-12 PROCEDURE — 99232 SBSQ HOSP IP/OBS MODERATE 35: CPT | Performed by: INTERNAL MEDICINE

## 2020-03-12 PROCEDURE — 2060000000 HC ICU INTERMEDIATE R&B

## 2020-03-12 RX ORDER — CLINDAMYCIN HYDROCHLORIDE 300 MG/1
300 CAPSULE ORAL 3 TIMES DAILY
Qty: 30 CAPSULE | Refills: 0 | Status: SHIPPED | OUTPATIENT
Start: 2020-03-12 | End: 2020-03-20 | Stop reason: SDUPTHER

## 2020-03-12 RX ADMIN — CEFAZOLIN SODIUM 2 G: 10 INJECTION, POWDER, FOR SOLUTION INTRAVENOUS at 09:40

## 2020-03-12 RX ADMIN — VANCOMYCIN HYDROCHLORIDE 1250 MG: 10 INJECTION, POWDER, LYOPHILIZED, FOR SOLUTION INTRAVENOUS at 13:06

## 2020-03-12 RX ADMIN — ENOXAPARIN SODIUM 80 MG: 80 INJECTION SUBCUTANEOUS at 09:40

## 2020-03-12 RX ADMIN — VANCOMYCIN HYDROCHLORIDE 1250 MG: 10 INJECTION, POWDER, LYOPHILIZED, FOR SOLUTION INTRAVENOUS at 01:07

## 2020-03-12 RX ADMIN — TRIAMCINOLONE ACETONIDE: 1 OINTMENT TOPICAL at 09:47

## 2020-03-12 RX ADMIN — Medication 10 ML: at 09:41

## 2020-03-12 ASSESSMENT — PAIN SCALES - GENERAL
PAINLEVEL_OUTOF10: 0
PAINLEVEL_OUTOF10: 0

## 2020-03-12 NOTE — PROGRESS NOTES
ID Follow-up NOTE  RESIDENT NOTE - reviewed / edited, attending note at bottom    CC:   Cellulitis  Antibiotics: Vancomycin / Ancef    Admit Date: 3/9/2020  Hospital Day: 4    Subjective:     Patient feels well  Denies any f/c, n/v/d. Leg improving. Objective:     Patient Vitals for the past 8 hrs:   BP Temp Temp src Pulse Resp SpO2   03/12/20 0348 (!) 111/58 97.9 °F (36.6 °C) Oral 59 18 96 %     I/O last 3 completed shifts: In: 800 [P.O.:800]  Out: 1000 [Urine:1000]  I/O this shift:  In: 240 [P.O.:240]  Out: -       EXAM:  GENERAL: No apparent distress. HEENT: Membranes moist, no conjunctival changes, no oral lesion  NECK:  Supple, no masses  LYMPH: No adenopathy   LUNGS: Clear b/l, no rales, no dullness  CARDIAC: RRR, no murmur appreciated  ABD:  + BS, soft / NT, no masses  EXT:  Upper extremities: arm erythremia, thickening on antecubital region.      LLE: swollen, erythematous    RLE: Maculopapular rash on anterior thigh  NEURO: No focal neurologic findings  PSYCH: Orientation, sensorium, mood normal  LINES:         Data Review:  Lab Results   Component Value Date    WBC 8.8 03/10/2020    HGB 14.5 03/10/2020    HCT 43.8 03/10/2020    MCV 86.5 03/10/2020     03/10/2020     Lab Results   Component Value Date    CREATININE 0.9 03/10/2020    BUN 16 03/10/2020     (L) 03/10/2020    K 4.3 03/10/2020     03/10/2020    CO2 25 03/10/2020       Hepatic Function Panel: No results found for: ALKPHOS, ALT, AST, PROT, BILITOT, BILIDIR, IBILI, LABALBU    MICRO:  3/9 BCx: NGTD (priliminary)    IMAGING:  None    Scheduled Meds:   enoxaparin  1 mg/kg Subcutaneous BID    vancomycin  15 mg/kg Intravenous Q12H    ceFAZolin  2 g Intravenous Q8H    triamcinolone   Topical BID    sodium chloride flush  10 mL Intravenous 2 times per day       Continuous Infusions:      PRN Meds:  diphenhydrAMINE, sodium chloride flush, acetaminophen **OR** acetaminophen, magnesium hydroxide, promethazine **OR** ondansetron      Assessment:     Cellulitis of LLE likely superimposed on underlying atopic dermatitis      Plan:     Continue vanc/cefazolin while inpatient  Can switch to clindamycin po as outpatient   Continue triamcinolone ointment  Follow with Dermatology (Dr. Monico Jorgensen) as outpatient    Discussed with Dr. Giancarlo Bennett MD    Addendum to Resident Progress Note:  Pt seen, examined and evaluated. I have reviewed the current history, physical findings, labs and assessment and plan and agree with note as documented by resident (Dr. Alvarenga).    63 yo man with no previous established medical care - no dx / meds  Hx rash as child   Intermittent flare of rash, involves flexure area of arm     Strained L leg in Oct, developed erythema, worse recently.    Seen at Urgent Care, 'little better' with topical steroid and antibiotic. Worsened, return to Urgent Care and referred to [facility] ED.     At [facility] 3/9 - afebrile, WBC 9.4.   Admit, started on vancomycin     IMP/  Probable underlying atopic dermatitis  L leg cellulitis, poss Streptococcal     REC/  Cont vancomycin + cefazolin  Topical steroid - triamcinolone oitment bid     Ok for discharge on clindamycin 300 mg qid x 10 days  Cont topical steroid - triamcinolone oitment bid  F/u Derm post-discharge - apt 3/13 at 8 am with Dr Monico Jorgensen     Discussed with pt   Antoine Diehl MD

## 2020-03-12 NOTE — PROGRESS NOTES
Discharge order received. Patient informed of discharge order. Discharge instructions reviewed with patient. Copy of discharge instructions given to patient. Signed prescriptions filled with meds to beds and picked up before discharge. Patient  verbalized understanding, denies needs or questions at this time. IV removed. All patient belongings packed and sent with patient upon discharge. Patient drove himself home.

## 2020-03-12 NOTE — PROGRESS NOTES
Clinical Pharmacy Progress Note    Admit date: 3/9/2020     Subjective/Objective:  Pt is a 65yom with no significant PMHx who is admitted with LLE swelling / redness / rash that has worsened over the past several weeks / months - being treated for LLE cellulitis with atopic eczema and rash. Interval update:  Dopplers with LLE DVT - enoxaparin increased to full therapeutic dose last evening. Pharmacy is consulted to dose Vancomycin per Dr. Aris Finnegan    Current antibiotics:  Cefazolin 2g IV q8h - day #3  Vancomycin - Pharmacy to dose - day #4   2g IV x1 3/9 PM   1.25g IV q12h (3/10 - current)      Recent Labs     03/09/20  2146 03/09/20  2249 03/10/20  0609   *  --  134*   K 5.8* 4.3 4.3   CL 96*  --  100   CO2 25  --  25   BUN 16  --  16   CREATININE 1.1  --  0.9   GLUCOSE 119*  --  118*       Estimated Creatinine Clearance: 87 mL/min (based on SCr of 0.9 mg/dL). Lab Results   Component Value Date    WBC 8.8 03/10/2020    HGB 14.5 03/10/2020    HCT 43.8 03/10/2020    MCV 86.5 03/10/2020     03/10/2020       No results found for: PROTIME, INR    Height:  5' 7\" (170.2 cm)  Weight:  185 lb 6.5 oz (84.1 kg)    Vancomycin Levels:  Trough  3/11 @ 10:42 = 12.8mcg/mL (drawn appropriately, on 1.25g IV q12h)    Culture results:  Blood (3/9) = No growth to date    Prophylaxis:  VTE:  Enoxaparin 1mg/kg q12h  GI:  Not indicated    Vaccination screening:  Pneumonia:  Not indicated  Influenza:  Pt refused    Assessment/Plan:  1)  LLE cellulitis:  Cefazolin - day #3 + Vancomycin - day #4  · Cefazolin -   Current dose remains appropriate based on indication and current renal function. Will continue to monitor and adjust as needed per [facility] Renal Dose Adjustment Policy. · Vancomycin - Pharmacy to dose  · Continue 1.25g IV q12h. Trough appropriate 3/11. · Doses are being given over 2 hours due to flushing & itching after first dose in ED. Pt has tolerated with slower infusion rate.   · If still here / on Vancomycin

## 2020-03-12 NOTE — DISCHARGE INSTR - COC
Continuity of Care Form    Patient Name: Eleazar Ochoa   :  1955  MRN:  [id number]    Admit date:  3/9/2020  Discharge date:  ***    Code Status Order: Full Code   Advance Directives:   Advance Care Flowsheet Documentation     Date/Time Healthcare Directive Type of Healthcare Directive Copy in [address] Agent's Name Healthcare Agent's Phone Number    [phone]  No, patient does not have an advance directive for healthcare treatment -- -- -- -- --          Admitting Physician:  Markos Josue MD  PCP: No primary care provider on file. Discharging Nurse: [facility] Unit/Room#: 3837/9313-52  Discharging Unit Phone Number: ***    Emergency Contact:   Extended Emergency Contact Information  Primary Emergency Contact: geraldine vieyra  Home Phone: [phone]  Relation: Brother/Sister    Past Surgical History:  No past surgical history on file. Immunization History: There is no immunization history on file for this patient. Active Problems:  Patient Active Problem List   Diagnosis Code    Localized swelling of left lower extremity R22.42    Atopic eczema L20.9    Left leg cellulitis L03. 116    Rash and other nonspecific skin eruption R21    Cellulitis of left leg L03.116       Isolation/Infection:   Isolation          No Isolation        Patient Infection Status     None to display          Nurse Assessment:  Last Vital Signs: /63   Pulse 67   Temp 97.9 °F (36.6 °C) (Oral)   Resp 16   Ht 5' 7\" (1.702 m)   Wt 185 lb 6.5 oz (84.1 kg)   SpO2 97%   BMI 29.04 kg/m²     Last documented pain score (0-10 scale): Pain Level: 0  Last Weight:   Wt Readings from Last 1 Encounters:   20 185 lb 6.5 oz (84.1 kg)     Mental Status:  oriented and alert    IV Access:  - None    Nursing Mobility/ADLs:  Walking   Independent  Transfer  Independent  Bathing  Independent  Dressing  Independent  Toileting  Independent  Feeding Independent  Med Admin  Independent  Med Delivery   whole    Wound Care Documentation and Therapy:        Elimination:  Continence:   · Bowel: Yes  · Bladder: Yes  Urinary Catheter: None   Colostomy/Ileostomy/Ileal Conduit: No       Date of Last BM: 3/12/20    Intake/Output Summary (Last 24 hours) at 3/12/2020 1311  Last data filed at 3/12/2020 0940  Gross per 24 hour   Intake 540 ml   Output 1000 ml   Net -460 ml     I/O last 3 completed shifts:   In: 800 [P.O.:800]  Out: 1000 [Urine:1000]    Safety Concerns:     [employer] Safety Concerns:788444812}    Impairments/Disabilities:      [employer] Impairments/Disabilities:329131253}    Nutrition Therapy:  Current Nutrition Therapy:   [employer] Diet List:886518487}    Routes of Feeding: {CHP DME Other Feedings:409653351}  Liquids: {Slp liquid thickness:69281}  Daily Fluid Restriction: {CHP DME Yes amt example:718274454}  Last Modified Barium Swallow with Video (Video Swallowing Test): {Done Not Done [facility]:037996511}    Treatments at the Time of Hospital Discharge:   Respiratory Treatments: ***  Oxygen Therapy:  {Therapy; copd oxygen:27674}  Ventilator:    { CC Vent IXZJ:116191242}    Rehab Therapies: {THERAPEUTIC INTERVENTION:6066603334}  Weight Bearing Status/Restrictions: [employer]  Weight Bearin}  Other Medical Equipment (for information only, NOT a DME order):  {EQUIPMENT:751322919}  Other Treatments: ***    Patient's personal belongings (please select all that are sent with patient):  {CHP DME Belongings:398449233}    RN SIGNATURE:  {Esignature:540867088}    CASE MANAGEMENT/SOCIAL WORK SECTION    Inpatient Status Date: ***    Readmission Risk Assessment Score:  Readmission Risk              Risk of Unplanned Readmission:        7           Discharging to Facility/ Agency   · Name:   · Address:  · Phone:  · Fax:    Dialysis Facility (if applicable)   · Name:  · Address:  · Dialysis Schedule:  · Phone:  · Fax:    / signature: {Esignature:155578095}    PHYSICIAN SECTION    Prognosis: {Prognosis:1800160882}    Condition at Discharge: [address] Patient Condition:241435577}    Rehab Potential (if transferring to Rehab): {Prognosis:3196096946}    Recommended Labs or Other Treatments After Discharge: ***    Physician Certification: I certify the above information and transfer of Liban Lomeli  is necessary for the continuing treatment of the diagnosis listed and that he requires {Admit to Appropriate Level of Care:08550} for {GREATER/LESS:043467357} 30 days.      Update Admission H&P: {CHP DME Changes in ARXMO:757066441}    PHYSICIAN SIGNATURE:  {Esignature:248833867}

## 2020-03-12 NOTE — PLAN OF CARE
Problem: Pain:  Goal: Pain level will decrease  Description: Pain level will decrease  Outcome: Ongoing  Note: Pt reports mild discomfort with his rash; He bathed his skin and kenalog cream applied and this seemed to assist him in relief. Will continue to assess pt for pain and intervene to relieve alterations in comfort. Problem: Falls - Risk of:  Goal: Will remain free from falls  Description: Will remain free from falls  Outcome: Ongoing  Note: Pt is ambulatory and aware of abilities. He uses his call light for needs. Will continue to round on pt for safety/needs. Problem: Skin Integrity:  Goal: Skin integrity will stabilize  Description: Skin integrity will stabilize  Outcome: Ongoing  Note: Pt skin is intact, though he does have left lower leg cellulitis with a rash all over his left leg to his groin. The rash also is on his right leg, torso back and upper extremities. Kenalog cream per order and antibiotics infusing. Will continue to assess skin and intervene to avoid skin breakdown.

## 2020-03-12 NOTE — CARE COORDINATION
Case Management Assessment            Discharge Note                    Date / Time of Note: 3/12/2020 3:27 PM                  Discharge Note Completed by: Dyllan Good    Patient Name: Yuriy Cantu   YOB: 1955  Diagnosis: Localized swelling of left lower extremity [R22.42]  Localized swelling of left lower extremity [R22.42]  Cellulitis of left leg [R89.725]   Date / Time: 3/9/2020  8:13 PM    Current PCP: No primary care provider on file. Clinic patient: No    Hospitalization in the last 30 days: No    Advance Directives:  Code Status: Full Code  [place] DNR form completed and on chart: No    Financial:  Payor: [employer] / Plan: [facility] / Product Type: *No Product type* /      Pharmacy:    [facility], [address] [place] [address]  Phone: [phone] Fax: [phone]      Assistance purchasing medications?: Potential Assistance Purchasing Medications: No  Assistance provided by Case Management: None at this time    Does patient want to participate in local refill/ meds to beds program?: Yes    Meds To Beds General Rules:  1. Can ONLY be done Monday- Friday between 8:30am-5pm  2. Prescription(s) must be in pharmacy by 3pm to be filled same day  3. Copy of patient's insurance/ prescription drug card and patient face sheet must be sent along with the prescription(s)  4. Cost of Rx cannot be added to hospital bill. If financial assistance is needed, please contact unit  or ;  or  CANNOT provide pharmacy voucher for patients co-pays  5.  Patients can then  the prescription on their way out of the hospital at discharge, or pharmacy can deliver to the bedside if staff is available. (payment due at time of pick-up or delivery - cash, check, or card accepted)     Able to afford home medications/ co-pay costs: Yes    ADLS:  Current PT AM-PAC Score: 22 /24  Current OT AM-PAC Score:   /24      DISCHARGE Disposition: Home- No Services Needed    LOC at discharge: Not Applicable  GAVIN Completed: No    Notification completed in HENS/PAS?:  Not Applicable    IMM Completed:   Not Indicated    Transportation:  Transportation PLAN for discharge: family   Mode of Transport: Private Car  Reason for medical transport: Not Applicable  Name of Transport Company: Not Applicable  Time of Transport: when family availalble    Transport form completed: No    Home Care:  [address] ordered at discharge: No  [address]: Not Applicable  Orders faxed: No    Durable Medical Equipment:  DME Provider: NA  Equipment obtained during hospitalization: NA    Home Oxygen and Respiratory Equipment:  Oxygen needed at discharge?: No  [address]: Not Applicable  Portable tank available for discharge?: No    Dialysis:  Dialysis patient: No    Dialysis Center:  Not Applicable    Hospice Services:  Location: Not Applicable  Agency: Not Applicable    Consents signed: Not Indicated    Referrals made at [facility] for outpatient continued care:  Not Applicable    Additional CM Notes:  CM confirmed  D/C home  Follow up out patient    . Follow Up: Primary Care Physician in one week     these medications from [place] [address]  [address] Reina Germain [phone] - F [phone]   1 apixaban   2 clindamycin   3 triamcinolone    The Plan for Transition of Care is related to the following treatment goals of Localized swelling of left lower extremity [R22.42]  Localized swelling of left lower extremity [R22.42]  Cellulitis of left leg [L03.116]    The Patient and/or patient representative Sidra Zavala and his family were provided with a choice of provider and agrees with the discharge plan Yes    Freedom of choice list was provided with basic dialogue that supports the patient's individualized plan of care/goals and shares the quality data associated with the providers.  Yes    Care Transitions patient: No    Carlos Enrique Cruz RN  [facility] [employer] [employer]  Case Management Department  Ph: [phone]

## 2020-03-13 ENCOUNTER — OFFICE VISIT (OUTPATIENT)
Dept: DERMATOLOGY | Age: 65
End: 2020-03-13
Payer: COMMERCIAL

## 2020-03-13 LAB
BLOOD CULTURE, ROUTINE: NORMAL
CULTURE, BLOOD 2: NORMAL

## 2020-03-13 PROCEDURE — 99202 OFFICE O/P NEW SF 15 MIN: CPT | Performed by: DERMATOLOGY

## 2020-03-13 RX ORDER — TRIAMCINOLONE ACETONIDE 1 MG/G
CREAM TOPICAL
Qty: 454 G | Refills: 1 | Status: SHIPPED | OUTPATIENT
Start: 2020-03-13 | End: 2020-09-16 | Stop reason: SDUPTHER

## 2020-03-13 NOTE — Clinical Note
Dear Ezequiel Cabrera,  I had the pleasure of seeing your patient, Sidra Ansari, in my office today. Thanks so much for involving me in his care. Please see my note and let me if you have any questions or concerns.   Best Regards,  Aj Barnett

## 2020-03-20 ENCOUNTER — OFFICE VISIT (OUTPATIENT)
Dept: FAMILY MEDICINE CLINIC | Age: 65
End: 2020-03-20
Payer: COMMERCIAL

## 2020-03-20 VITALS
SYSTOLIC BLOOD PRESSURE: 120 MMHG | HEART RATE: 67 BPM | WEIGHT: 182.2 LBS | BODY MASS INDEX: 28.54 KG/M2 | DIASTOLIC BLOOD PRESSURE: 70 MMHG | OXYGEN SATURATION: 96 % | TEMPERATURE: 98 F

## 2020-03-20 PROCEDURE — 99203 OFFICE O/P NEW LOW 30 MIN: CPT | Performed by: FAMILY MEDICINE

## 2020-03-20 RX ORDER — CLINDAMYCIN HYDROCHLORIDE 300 MG/1
300 CAPSULE ORAL 3 TIMES DAILY
Qty: 12 CAPSULE | Refills: 0 | Status: SHIPPED | OUTPATIENT
Start: 2020-03-20 | End: 2020-03-24

## 2020-03-20 ASSESSMENT — ENCOUNTER SYMPTOMS
RHINORRHEA: 0
VOMITING: 0
SORE THROAT: 0
BLOOD IN STOOL: 0
ABDOMINAL PAIN: 0
NAUSEA: 0
DIARRHEA: 0
TROUBLE SWALLOWING: 0
BACK PAIN: 0
WHEEZING: 0
PHOTOPHOBIA: 0
SHORTNESS OF BREATH: 0
COLOR CHANGE: 1
STRIDOR: 0
COUGH: 0
CONSTIPATION: 0
CHEST TIGHTNESS: 0

## 2020-03-20 NOTE — PROGRESS NOTES
surgical history, family history, medications,  andallergies  were all reviewed and updated as appropriate today. Review of Systems   Constitutional: Negative for chills, fatigue and fever. HENT: Negative for hearing loss, rhinorrhea, sore throat and trouble swallowing. Eyes: Negative for photophobia and visual disturbance. Respiratory: Negative for cough, chest tightness, shortness of breath, wheezing and stridor. Cardiovascular: Negative for chest pain, palpitations and leg swelling. Gastrointestinal: Negative for abdominal pain, blood in stool, constipation, diarrhea, nausea and vomiting. Genitourinary: Negative for difficulty urinating, dysuria and frequency. Musculoskeletal: Negative for arthralgias, back pain, myalgias, neck pain and neck stiffness. Skin: Positive for color change (redness LLE) and rash. Negative for pallor and wound. Neurological: Negative for dizziness, syncope, weakness, light-headedness, numbness and headaches. Psychiatric/Behavioral: Negative for dysphoric mood and sleep disturbance. The patient is not nervous/anxious. Vitals:    03/20/20 1558   BP: 120/70   Pulse: 67   Temp: 98 °F (36.7 °C)   SpO2: 96%   Weight: 182 lb 3.2 oz (82.6 kg)       Physical Exam  Vitals signs reviewed. Constitutional:       General: He is not in acute distress. Appearance: He is well-developed. HENT:      Head: Normocephalic and atraumatic. Right Ear: Hearing, tympanic membrane, ear canal and external ear normal.      Left Ear: Hearing, tympanic membrane, ear canal and external ear normal.      Mouth/Throat:      Pharynx: No oropharyngeal exudate. Eyes:      Conjunctiva/sclera: Conjunctivae normal.      Pupils: Pupils are equal, round, and reactive to light. Neck:      Musculoskeletal: Normal range of motion and neck supple. Thyroid: No thyromegaly. Cardiovascular:      Rate and Rhythm: Normal rate and regular rhythm.       Heart sounds: Normal heart

## 2020-09-16 ENCOUNTER — VIRTUAL VISIT (OUTPATIENT)
Dept: FAMILY MEDICINE CLINIC | Age: 65
End: 2020-09-16
Payer: COMMERCIAL

## 2020-09-16 PROBLEM — I82.5Z2 CHRONIC DEEP VEIN THROMBOSIS (DVT) OF DISTAL VEIN OF LEFT LOWER EXTREMITY (HCC): Status: ACTIVE | Noted: 2020-09-16

## 2020-09-16 PROCEDURE — 99214 OFFICE O/P EST MOD 30 MIN: CPT | Performed by: NURSE PRACTITIONER

## 2020-09-16 RX ORDER — TRIAMCINOLONE ACETONIDE 1 MG/G
CREAM TOPICAL
Qty: 454 G | Refills: 1 | Status: SHIPPED | OUTPATIENT
Start: 2020-09-16 | End: 2021-02-18

## 2020-09-16 ASSESSMENT — ENCOUNTER SYMPTOMS: COLOR CHANGE: 1

## 2020-09-16 NOTE — ASSESSMENT & PLAN NOTE
Discussed this at length with patient. He wishes to not have a repeat Doppler ultrasound at this time due to financial difficulties. He believes the redness is due to the clot and not due to infection. He has agreed to do an additional 30 days of Eliquis which we will provide from him for him from this office. If the redness or swelling returns at the end of the 30 days he will have a venous Doppler to reassess. If at any time during the next 30 days the redness or swelling returns or becomes tender and painful he is to come to the office for an evaluation.

## 2020-09-16 NOTE — PROGRESS NOTES
2020    TELEHEALTH EVALUATION -- Audio/Visual (During [facility]-17 public health emergency)    HPI:    Marco Ramires (:  1955) has requested an audio/video evaluation for the following concern(s):    Last April was hospitalized for DVT. Has been on Eliquis since that time however intermittently as he has not had access to the medication. He reports his leg is now red mildly edematous which is similar to when he first was treated for the clot in addition to underlying infection. Last month he was given 30 days of Eliquis in addition to an antibiotic for 10 days and the redness went away. He believes the redness is related to the clot and not an infection. He reports his leg is not hot and is not tender. He believes this clot to be unprovoked. He recalls it starting in 2019 does not recall any injury prior to this he had not had any previous clots. He does have recurring eczema to his bilateral lower extremities    Review of Systems   Skin: Positive for color change and rash. All other systems reviewed and are negative. Prior to Visit Medications    Medication Sig Taking? Authorizing Provider   apixaban (ELIQUIS) 5 MG TABS tablet Take 1 tablet by mouth 2 times daily Yes Fannie Rivera APRN - CNP   apixaban (ELIQUIS) 5 MG TABS tablet Take 1 tablet by mouth 2 times daily  Rosa Velásquez MD   triamcinolone (KENALOG) 0.1 % cream Apply to affected areas of the skin 2 to 3 times daily until improved. Suleman Thomas MD   apixaban Tiffanie Goshen DVT/PE STARTER PACK) 5 MG TABS tablet Take 10 mg (2 tablets) orally twice daily for 7 days, then take 5 mg (1 tablet) orally twice daily thereafter.   Conterras Daniels MD       Social History     Tobacco Use    Smoking status: Never Smoker    Smokeless tobacco: Never Used   Substance Use Topics    Alcohol use: Not on file    Drug use: Not on file        Past Medical History:   Diagnosis Date    Atopic dermatitis     Carpal tunnel syndrome  DVT (deep venous thrombosis) (HCC)        No past surgical history on file. PHYSICAL EXAMINATION:  [ INSTRUCTIONS:  \"[x]\" Indicates a positive item  \"[]\" Indicates a negative item  -- DELETE ALL ITEMS NOT EXAMINED]  Vital Signs: (As obtained by patient/caregiver or practitioner observation)    Blood pressure-  Heart rate-    Respiratory rate-    Temperature-  Pulse oximetry-     Constitutional: [x] Appears well-developed and well-nourished [x] No apparent distress      [] Abnormal-   Mental status  [x] Alert and awake  [x] Oriented to person/place/time [x]Able to follow commands      Eyes:  EOM    [x]  Normal  [] Abnormal-  Sclera  [x]  Normal  [] Abnormal -         Discharge [x]  None visible  [] Abnormal -    HENT:   [x] Normocephalic, atraumatic. [] Abnormal   [] Mouth/Throat: Mucous membranes are moist.     External Ears [] Normal  [] Abnormal-     Neck: [x] No visualized mass     Pulmonary/Chest: [x] Respiratory effort normal.  [x] No visualized signs of difficulty breathing or respiratory distress        [] Abnormal-      Musculoskeletal:   [x] Normal gait with no signs of ataxia         [] Normal range of motion of neck        [] Abnormal-       Neurological:        [x] No Facial Asymmetry (Cranial nerve 7 motor function) (limited exam to video visit)          [x] No gaze palsy        [] Abnormal-         Skin:        [] No significant exanthematous lesions or discoloration noted on facial skin         [x] Abnormal-mild erythema noted to left lower extremity thigh to mid calf. Visualization difficult due to [facility], [facility] angles           Psychiatric:       [x] Normal Affect [x] No Hallucinations        [] Abnormal-     Other pertinent observable physical exam findings-     ASSESSMENT/PLAN:   Diagnosis Orders   1.  Chronic deep vein thrombosis (DVT) of distal vein of left lower extremity (HCC)  VL DUP LOWER EXTREMITY VENOUS LEFT    apixaban (ELIQUIS) 5 MG TABS tablet     Chronic deep vein thrombosis (DVT) of distal vein of left lower extremity ([address]  Discussed this at length with patient. He wishes to not have a repeat Doppler ultrasound at this time due to financial difficulties. He believes the redness is due to the clot and not due to infection. He has agreed to do an additional 30 days of Eliquis which we will provide from him for him from this office. If the redness or swelling returns at the end of the 30 days he will have a venous Doppler to reassess. If at any time during the next 30 days the redness or swelling returns or becomes tender and painful he is to come to the office for an evaluation. No follow-ups on file. Aldine Meckel is a 62 y.o. male being evaluated by a Virtual Visit (video visit) encounter to address concerns as mentioned above. A caregiver was present when appropriate. Due to this being a TeleHealth encounter (During [facility]-78 public health emergency), evaluation of the following organ systems was limited: Vitals/Constitutional/EENT/Resp/CV/GI//MS/Neuro/Skin/Heme-Lymph-Imm. Pursuant to the emergency declaration under the [address] [place] [address], [address] [place] [address] authority and the [employer] Act, this Virtual Visit was conducted with patient's (and/or legal guardian's) consent, to reduce the patient's risk of exposure to COVID-19 and provide necessary medical care. The patient (and/or legal guardian) has also been advised to contact this office for worsening conditions or problems, and seek emergency medical treatment and/or call 911 if deemed necessary. Patient identification was verified at the start of the visit: yes    Total time spent on this encounter:35 minutes  Services were provided through a video synchronous discussion virtually to substitute for in-person clinic visit. Patient and provider were located at their individual homes.     --RERE Gannon CNP on 9/16/2020 at 12:36 PM    An electronic signature was used to authenticate this note.

## 2021-02-17 ENCOUNTER — NURSE TRIAGE (OUTPATIENT)
Dept: OTHER | Facility: CLINIC | Age: 66
End: 2021-02-17

## 2021-02-17 NOTE — TELEPHONE ENCOUNTER
Left leg swollen trying to go past knee to foot. Had this happen last year and it was a blood clot. Pain 4-5/10. Redness. Pain to back of calf primarily. Reason for Disposition   MILD swelling of both ankles (i.e., pedal edema) AND new onset or worsening    Answer Assessment - Initial Assessment Questions  1. ONSET: \"When did the swelling start? \" (e.g., minutes, hours, days)        A few weeks ago    2. LOCATION: \"What part of the leg is swollen? \"  \"Are both legs swollen or just one leg? \"        See above    3. SEVERITY: \"How bad is the swelling? \" (e.g., localized; mild, moderate, severe)   - Localized - small area of swelling localized to one leg   - MILD pedal edema - swelling limited to foot and ankle, pitting edema < 1/4 inch (6 mm) deep, rest and elevation eliminate most or all swelling   - MODERATE edema - swelling of lower leg to knee, pitting edema > 1/4 inch (6 mm) deep, rest and elevation only partially reduce swelling   - SEVERE edema - swelling extends above knee, facial or hand swelling present         Moderate    4. REDNESS: \"Does the swelling look red or infected? \"        Yes    5. PAIN: \"Is the swelling painful to touch? \" If so, ask: \"How painful is it? \"   (Scale 1-10; mild, moderate or severe)        See above    6. FEVER: \"Do you have a fever? \" If so, ask: \"What is it, how was it measured, and when did it start? \"         No    7. CAUSE: \"What do you think is causing the leg swelling? \"        Blood clot    8. MEDICAL HISTORY: \"Do you have a history of heart failure, kidney disease, liver failure, or cancer? \"        No    9. RECURRENT SYMPTOM: \"Have you had leg swelling before? \" If so, ask: \"When was the last time? \" \"What happened that time? \"        Yes see above    10. OTHER SYMPTOMS: \"Do you have any other symptoms? \" (e.g., chest pain, difficulty breathing)          No    11. PREGNANCY: \"Is there any chance you are pregnant? \" \"When was your last menstrual period? \"          NA    Protocols used: LEG SWELLING AND EDEMA-ADULT-OH    Caller provided care advice and instructed to call back with worsening symptoms. Attention Provider: Thank you for allowing me to participate in the care of your patient. The patient was connected to triage in response to information provided to the ECC. Please do not respond through this encounter as the response is not directed to a shared pool. Warm transfer to [facility] ([facility] If unable to be seen informed to be seen at ED.

## 2021-02-18 ENCOUNTER — OFFICE VISIT (OUTPATIENT)
Dept: FAMILY MEDICINE CLINIC | Age: 66
End: 2021-02-18
Payer: COMMERCIAL

## 2021-02-18 VITALS
BODY MASS INDEX: 30.32 KG/M2 | DIASTOLIC BLOOD PRESSURE: 82 MMHG | HEART RATE: 70 BPM | TEMPERATURE: 96.9 F | HEIGHT: 67 IN | SYSTOLIC BLOOD PRESSURE: 128 MMHG | WEIGHT: 193.2 LBS

## 2021-02-18 DIAGNOSIS — I82.5Z2 CHRONIC DEEP VEIN THROMBOSIS (DVT) OF DISTAL VEIN OF LEFT LOWER EXTREMITY (HCC): Primary | ICD-10-CM

## 2021-02-18 DIAGNOSIS — L03.116 CELLULITIS OF LEFT LEG: ICD-10-CM

## 2021-02-18 DIAGNOSIS — L20.9 ATOPIC DERMATITIS, UNSPECIFIED TYPE: ICD-10-CM

## 2021-02-18 PROCEDURE — 99214 OFFICE O/P EST MOD 30 MIN: CPT | Performed by: NURSE PRACTITIONER

## 2021-02-18 RX ORDER — CLINDAMYCIN HYDROCHLORIDE 300 MG/1
300 CAPSULE ORAL 3 TIMES DAILY
Qty: 30 CAPSULE | Refills: 0 | Status: SHIPPED | OUTPATIENT
Start: 2021-02-18 | End: 2021-02-28

## 2021-02-18 ASSESSMENT — ENCOUNTER SYMPTOMS
WHEEZING: 0
DIARRHEA: 0
SHORTNESS OF BREATH: 0
NAUSEA: 0
COUGH: 0
STRIDOR: 0
ABDOMINAL PAIN: 0
COLOR CHANGE: 1
VOMITING: 0
CONSTIPATION: 0
CHEST TIGHTNESS: 0
EYES NEGATIVE: 1
BLOOD IN STOOL: 0

## 2021-02-18 ASSESSMENT — PATIENT HEALTH QUESTIONNAIRE - PHQ9
SUM OF ALL RESPONSES TO PHQ9 QUESTIONS 1 & 2: 0
2. FEELING DOWN, DEPRESSED OR HOPELESS: 0
SUM OF ALL RESPONSES TO PHQ QUESTIONS 1-9: 0
1. LITTLE INTEREST OR PLEASURE IN DOING THINGS: 0

## 2021-02-18 NOTE — ASSESSMENT & PLAN NOTE
US ordered, pt reports will not get d/t financial constraints  Eliquis 10mg BID x10 days, then 5mg BID x6 months  Educated on risks of bleeding  Follow-up 1 month or if not getting better

## 2021-02-19 ENCOUNTER — TELEPHONE (OUTPATIENT)
Dept: FAMILY MEDICINE CLINIC | Age: 66
End: 2021-02-19

## 2021-02-19 NOTE — TELEPHONE ENCOUNTER
----- Message from Елена Vargas sent at 2/18/2021  4:32 PM EST -----  Subject: Message to Provider    QUESTIONS  Information for Provider? Insurance will only cover 74 pills   80 were prescribed by the provider. So the patient would like the   provider to adjust the number of pills and get that sent over to the   pharmacy.   ---------------------------------------------------------------------------  --------------  [address]  What is the best way for the office to contact you? OK to leave message on   voicemail  Preferred Call Back Phone Number? [phone]  ---------------------------------------------------------------------------  --------------  SCRIPT ANSWERS  Relationship to Patient?  Self

## 2021-02-19 NOTE — TELEPHONE ENCOUNTER
----- Message from Alix Gunter sent at 2/18/2021  4:32 PM EST -----  Subject: Message to Provider    QUESTIONS  Information for Provider? Insurance will only cover 74 pills   80 were prescribed by the provider. So the patient would like the   provider to adjust the number of pills and get that sent over to the   pharmacy.   ---------------------------------------------------------------------------  --------------  [address]  What is the best way for the office to contact you? OK to leave message on   voicemail  Preferred Call Back Phone Number? [phone]  ---------------------------------------------------------------------------  --------------  SCRIPT ANSWERS  Relationship to Patient?  Self

## 2021-02-19 NOTE — TELEPHONE ENCOUNTER
----- Message from Higinio Vargas sent at 2/18/2021  4:32 PM EST -----  Subject: Message to Provider    QUESTIONS  Information for Provider? Insurance will only cover 74 pills   80 were prescribed by the provider. So the patient would like the   provider to adjust the number of pills and get that sent over to the   pharmacy.   ---------------------------------------------------------------------------  --------------  [address]  What is the best way for the office to contact you? OK to leave message on   voicemail  Preferred Call Back Phone Number? [phone]  ---------------------------------------------------------------------------  --------------  SCRIPT ANSWERS  Relationship to Patient?  Self

## 2021-04-29 ENCOUNTER — OFFICE VISIT (OUTPATIENT)
Dept: FAMILY MEDICINE CLINIC | Age: 66
End: 2021-04-29
Payer: COMMERCIAL

## 2021-04-29 ENCOUNTER — NURSE TRIAGE (OUTPATIENT)
Dept: OTHER | Facility: CLINIC | Age: 66
End: 2021-04-29

## 2021-04-29 VITALS
OXYGEN SATURATION: 98 % | SYSTOLIC BLOOD PRESSURE: 120 MMHG | TEMPERATURE: 97.7 F | HEIGHT: 67 IN | HEART RATE: 75 BPM | WEIGHT: 201 LBS | BODY MASS INDEX: 31.55 KG/M2 | DIASTOLIC BLOOD PRESSURE: 80 MMHG

## 2021-04-29 DIAGNOSIS — L03.116 LEFT LEG CELLULITIS: ICD-10-CM

## 2021-04-29 PROCEDURE — 99214 OFFICE O/P EST MOD 30 MIN: CPT | Performed by: NURSE PRACTITIONER

## 2021-04-29 RX ORDER — CLINDAMYCIN HYDROCHLORIDE 300 MG/1
300 CAPSULE ORAL 3 TIMES DAILY
Qty: 30 CAPSULE | Refills: 0 | Status: SHIPPED | OUTPATIENT
Start: 2021-04-29 | End: 2021-05-09

## 2021-04-29 ASSESSMENT — ENCOUNTER SYMPTOMS: COLOR CHANGE: 1

## 2021-04-29 ASSESSMENT — PATIENT HEALTH QUESTIONNAIRE - PHQ9
SUM OF ALL RESPONSES TO PHQ QUESTIONS 1-9: 0
SUM OF ALL RESPONSES TO PHQ9 QUESTIONS 1 & 2: 0
1. LITTLE INTEREST OR PLEASURE IN DOING THINGS: 0
SUM OF ALL RESPONSES TO PHQ QUESTIONS 1-9: 0
2. FEELING DOWN, DEPRESSED OR HOPELESS: 0

## 2021-05-11 ENCOUNTER — OFFICE VISIT (OUTPATIENT)
Dept: FAMILY MEDICINE CLINIC | Age: 66
End: 2021-05-11
Payer: COMMERCIAL

## 2021-05-11 ENCOUNTER — HOSPITAL ENCOUNTER (OUTPATIENT)
Dept: WOUND CARE | Age: 66
Discharge: HOME | End: 2021-05-11
Payer: COMMERCIAL

## 2021-05-11 VITALS
OXYGEN SATURATION: 98 % | TEMPERATURE: 97.3 F | SYSTOLIC BLOOD PRESSURE: 120 MMHG | BODY MASS INDEX: 30.76 KG/M2 | DIASTOLIC BLOOD PRESSURE: 80 MMHG | HEIGHT: 67 IN | HEART RATE: 68 BPM | WEIGHT: 196 LBS

## 2021-05-11 VITALS
TEMPERATURE: 97.7 F | HEART RATE: 73 BPM | WEIGHT: 194 LBS | SYSTOLIC BLOOD PRESSURE: 147 MMHG | DIASTOLIC BLOOD PRESSURE: 79 MMHG | BODY MASS INDEX: 30.39 KG/M2 | RESPIRATION RATE: 16 BRPM

## 2021-05-11 DIAGNOSIS — L03.116 LEFT LEG CELLULITIS: ICD-10-CM

## 2021-05-11 DIAGNOSIS — R21 RASH AND OTHER NONSPECIFIC SKIN ERUPTION: ICD-10-CM

## 2021-05-11 DIAGNOSIS — R60.0 EDEMA OF LEFT LOWER LEG: ICD-10-CM

## 2021-05-11 DIAGNOSIS — L03.116 CELLULITIS OF LEFT LEG: Primary | ICD-10-CM

## 2021-05-11 PROCEDURE — 99213 OFFICE O/P EST LOW 20 MIN: CPT

## 2021-05-11 PROCEDURE — 29581 APPL MULTLAYER CMPRN SYS LEG: CPT

## 2021-05-11 PROCEDURE — 99213 OFFICE O/P EST LOW 20 MIN: CPT | Performed by: NURSE PRACTITIONER

## 2021-05-11 RX ORDER — LIDOCAINE HYDROCHLORIDE 40 MG/ML
SOLUTION TOPICAL ONCE
Status: CANCELLED | OUTPATIENT
Start: 2021-05-11 | End: 2021-05-11

## 2021-05-11 RX ORDER — LIDOCAINE 50 MG/G
OINTMENT TOPICAL ONCE
Status: CANCELLED | OUTPATIENT
Start: 2021-05-11 | End: 2021-05-11

## 2021-05-11 RX ORDER — LIDOCAINE 40 MG/G
CREAM TOPICAL ONCE
Status: CANCELLED | OUTPATIENT
Start: 2021-05-11 | End: 2021-05-11

## 2021-05-11 RX ORDER — LIDOCAINE HYDROCHLORIDE 20 MG/ML
JELLY TOPICAL ONCE
Status: CANCELLED | OUTPATIENT
Start: 2021-05-11 | End: 2021-05-11

## 2021-05-11 RX ORDER — GINSENG 100 MG
CAPSULE ORAL ONCE
Status: CANCELLED | OUTPATIENT
Start: 2021-05-11 | End: 2021-05-11

## 2021-05-11 RX ORDER — BETAMETHASONE DIPROPIONATE 0.05 %
OINTMENT (GRAM) TOPICAL ONCE
Status: CANCELLED | OUTPATIENT
Start: 2021-05-11 | End: 2021-05-11

## 2021-05-11 RX ORDER — BACITRACIN ZINC AND POLYMYXIN B SULFATE 500; 1000 [USP'U]/G; [USP'U]/G
OINTMENT TOPICAL ONCE
Status: CANCELLED | OUTPATIENT
Start: 2021-05-11 | End: 2021-05-11

## 2021-05-11 RX ORDER — CLOBETASOL PROPIONATE 0.5 MG/G
OINTMENT TOPICAL ONCE
Status: CANCELLED | OUTPATIENT
Start: 2021-05-11 | End: 2021-05-11

## 2021-05-11 RX ORDER — BACITRACIN, NEOMYCIN, POLYMYXIN B 400; 3.5; 5 [USP'U]/G; MG/G; [USP'U]/G
OINTMENT TOPICAL ONCE
Status: CANCELLED | OUTPATIENT
Start: 2021-05-11 | End: 2021-05-11

## 2021-05-11 RX ORDER — CLINDAMYCIN HYDROCHLORIDE 150 MG/1
150 CAPSULE ORAL 3 TIMES DAILY
COMMUNITY
End: 2021-05-11 | Stop reason: ALTCHOICE

## 2021-05-11 RX ORDER — GENTAMICIN SULFATE 1 MG/G
OINTMENT TOPICAL ONCE
Status: CANCELLED | OUTPATIENT
Start: 2021-05-11 | End: 2021-05-11

## 2021-05-11 ASSESSMENT — PATIENT HEALTH QUESTIONNAIRE - PHQ9: SUM OF ALL RESPONSES TO PHQ QUESTIONS 1-9: 0

## 2021-05-11 NOTE — PROGRESS NOTES
Stella Pandya (:  1955) is a 60 y.o. male,Established patient, here for evaluation of the following chief complaint(s): Other (f/u DVT , cellulitis)      ASSESSMENT/PLAN:  1. Left leg cellulitis  Assessment & Plan:  Mild improvement noted after the antibiotics. I am concerned that he has some continue infection in this leg needing further evaluation. He is refusing imaging studies at this time, refuses to have vascular studies done. We will have him evaluated by wound care for proper treatment and compression as needed  2. Rash and other nonspecific skin eruption  Assessment & Plan:   Mild improvement noted in topic dermatitis/eczema      No follow-ups on file. SUBJECTIVE/OBJECTIVE:  Serenity Sparks is a 60-year-old gentleman with history of chronic DVT left lower extremity in addition to atopic eczema bilateral lower extremities. Today's a follow-up for cellulitis to the left lower extremity for which he is just completed a 10-day course of clindamycin. States immediately following the completion of the course the swelling in his left lower leg and the redness had significantly decreased however this morning noticed it to be swollen again and erythema is returning. He does use a thick Eucerin type cream to his leg prior to wrapping it with an Ace bandage for compression. He is on chronic Eliquis for the DVT. Here is refusing to have follow-up imaging done for vascular studies for financial reasons        Current Outpatient Medications   Medication Sig Dispense Refill    clindamycin (CLEOCIN) 150 MG capsule Take 150 mg by mouth 3 times daily      apixaban (ELIQUIS) 5 MG TABS tablet Take 1 tablet by mouth 2 times daily Start with 10mg twice daily for 10 days then 5mg twice daily for 6 months 74 tablet 5     No current facility-administered medications for this visit. Review of Systems   All other systems reviewed and are negative.       Vitals:    21 0958   BP: 120/80   Pulse: 68 Temp: 97.3 °F (36.3 °C)   SpO2: 98%   Weight: 196 lb (88.9 kg)   Height: 5' 7\" (1.702 m)       Physical Exam  Skin:     Comments: Left lower extremity erythema concentrating to medial super malleolar area. 3 inch fissure noted, multiple areas of serous drainage, nonpitting edema toes to calf, feet warm, no excessive heat, capillary refill 4 seconds to toes. Significant scaling noted                 An electronic signature was used to authenticate this note.     --Pretty Alonso, APRPIPE - CNP

## 2021-05-11 NOTE — ASSESSMENT & PLAN NOTE
Mild improvement noted after the antibiotics. I am concerned that he has some continue infection in this leg needing further evaluation. He is refusing imaging studies at this time, refuses to have vascular studies done.   We will have him evaluated by wound care for proper treatment and compression as needed

## 2021-05-11 NOTE — PROGRESS NOTES
[address]   Progress Note and Procedure Note      Jolanta Baires  MEDICAL RECORD NUMBER:  [id number]  AGE: 60 y.o. GENDER: male  : 1955  EPISODE DATE:  2021    Subjective:     Chief Complaint   Patient presents with    Wound Check     initial         HISTORY of PRESENT ILLNESS HPI     Jolanta Baires is a 60 y.o. male who presents today for wound/ulcer evaluation. History of Wound Context: Patient presents today for further evaluation management of a left lower extremity cellulitis. Patient has a prolonged history of lower extremity redness and swelling with a history of a DVT. Patient relates he is on Eliquis but during the Covid pandemic there were times that he was unable to get his medication. Patient relates that he has been on clindamycin and \"just needs a few more days\". He states that yesterday his left lower extremity was not swollen at all. Patient also has chronic atopic dermatitis. Patient relates he has been cleaning the area with alcohol daily. Wound/Ulcer Pain Timing/Severity: intermittent, moderate  Quality of pain: burning  Severity:  3 / 10   Modifying Factors: None  Associated Signs/Symptoms: edema, erythema and drainage    Ulcer Identification:  Ulcer Type: venous    Contributing Factors: edema, venous stasis and DVT    Acute Wound: N/A    PAST MEDICAL HISTORY        Diagnosis Date    Atopic dermatitis     Carpal tunnel syndrome     DVT (deep venous thrombosis) (HCC)     Hx of blood clots        PAST SURGICAL HISTORY    Past Surgical History:   Procedure Laterality Date    KNEE SURGERY Left        FAMILY HISTORY    History reviewed. No pertinent family history.     SOCIAL HISTORY    Social History     Tobacco Use    Smoking status: Never Smoker    Smokeless tobacco: Never Used   Substance Use Topics    Alcohol use: Not on file    Drug use: Never       ALLERGIES    Allergies   Allergen Reactions    Penicillins Unknown what reaction    Vancomycin Other (See Comments)     Izzy syndrome with Vanc 3/9/20 - tolerates with slower infusion rate       MEDICATIONS    Current Outpatient Medications on File Prior to Encounter   Medication Sig Dispense Refill    apixaban (ELIQUIS) 5 MG TABS tablet Take 1 tablet by mouth 2 times daily Start with 10mg twice daily for 10 days then 5mg twice daily for 6 months 74 tablet 5     No current facility-administered medications on file prior to encounter. REVIEW OF SYSTEMS  Review of Systems    Pertinent items are noted in HPI. Review of Systems: A 12 point review of symptoms is unremarkable with the exception of the chief complaint. Patient specifically denies nausea, fever, vomiting, chills, shortness of breath, chest pain, abdominal pain, constipation or difficulty urinating. Objective:      BP (!) 147/79   Pulse 73   Temp 97.7 °F (36.5 °C) (Temporal)   Resp 16   Wt 194 lb 0.1 oz (88 kg)   BMI 30.39 kg/m²     Wt Readings from Last 3 Encounters:   05/11/21 194 lb 0.1 oz (88 kg)   05/11/21 196 lb (88.9 kg)   04/29/21 201 lb (91.2 kg)       PHYSICAL EXAM  Physical Exam    DP/PT pulses palpable bilaterally. CFT brisk to all digits. Digits are pink and warm to the touch. Hair growth is normal in appearance. Significant edema noted left lower extremity. There is no warmth associated with this swelling but there is redness. . No calf pain with palpation noted on the right and there is pain on the left. Epicritic sensation is grossly intact bilaterally. Negative clonus and babinski reflex is down going. Patient has diffuse erythematous plaques noted on his bilateral lower extremities and upper extremities. The plaques on the left are larger with more intense erythema. There is area of weeping noted at the medial aspect of the left ankle with serous drainage noted.   There are excoriated areas associated with this that also extend up over the dorsal aspect of the foot.  Patient's muscle strength for dorsiflexors plantar flexors inverters and everters is intact and symmetrical bilaterally. Assessment:        Problem List Items Addressed This Visit     Cellulitis of left leg - Primary    Relevant Orders    Initiate Outpatient Wound Care Protocol    VL Reflux Ector Insufficiency Stdy Left      Other Visit Diagnoses     Edema of left lower leg        Relevant Orders    VL Reflux Ector Insufficiency Stdy Left               Plan:   E/M x30 minutes of a new problem of left lower extremity venous stasis dermatitis. Prolonged discussion with patient that as the area is not warm it is just red and swollen it is likely an exacerbation of his dermatitis complicated by his venous stasis disease. Discussed with patient treatment options and recommend a venous reflux study to assess for incompetent vessels as this is likely contributing to his chronic swelling. Patient relates he wears compression socks but presents today just wearing long black socks. Patient refuses further vascular testing citing cost.  Encourage patient to follow-up with getting his reflux study as recurrent bouts of \"cellulitis\" can ultimately lead to a worsening infection with antibiotic resistance. If patient follows up with getting his reflux study and it is abnormal will further refer patient to Dr. Willena Gitelman for further evaluation and management. A multilayer compression dressing with zinc oxide paste was applied to the left lower extremity. Patient was instructed to keep it intact x1 week. Prolonged discussion with patient that if his symptoms improved with compression infection is not the underlying etiology but rather chronic venous disease. Patient was instructed to keep the area clean dry and intact. Treatment Note please see attached Discharge Instructions    Written patient dismissal instructions given to patient and signed by patient or POA.        Reappoint 1 week for follow-up    Discharge [facility] Physician Orders and Discharge Instructions  [address] [address] [address]  Telephone: [phone]    Please call [phone]) to schedule your test. For your convenience try to schedule your test on the same date as your next wound care appointment, unless instructed otherwise by your physician. The ordered test is with your discharge instructions. When you call press option 2 then option 1. Please be sure to bring wound care supplies so you are able to replace dressing after testing is completed unless your are scheduled to see the [address] [place] [address] Road afterwards. Vascular tests ordered by Wound Care Physicians may take up to 2 hours to complete. Refrain from smoking 2 hours prior to test and please arrive 30 minutes early to complete registration. Wash hands with soap and water prior to and after every dressing change. Wound Cleansing: (All wounds are cleaned with 0.9% saline during your wound care visit)   · Keep wounds dry in the shower unless otherwise instructed by the physician. · For wounds on lower legs, cast covers can be purchased at local drug stores, so that you may shower and keep the wound(s) dry. Kendal wound Topical Treatments:  Do not apply lotions, creams, or ointments to the skin around the wound bed unless directed as followed:     [] Apply around the wound: [] moisturizing lotion [] Antifungal ointment [] No-Sting barrier film [] Zinc paste [] Other:       Edema Control:  Apply: [x] Compression Stocking  [] Left Lower Leg [x] Right Lower Leg        Apply every morning immediately when getting up. They should be applied to affected leg(s) from mid foot to knee making sure to cover the heel. Remove every night before going to bed.       [x] Elevate leg(s) above the level of the heart for 30 minutes 4-5 times a day and/or when sitting. [x] Avoid prolonged standing in one place. Multilayer Compression Wrap:    Type: Zinc Unna Boot   · Applied in Clinic to the :  Left lower leg(s) on 5/11/2021  · Do not get leg(s) with wrap wet. · If wraps become too tight call the center or completely remove the wrap. · Elevate leg(s) above the level of the heart when sitting. · Avoid prolonged standing in one place. Dietary:  Important dietary reminders:  1. Increase Protein intake (i.e. Lean meats, fish, eggs, legumes, and yogurt)  2. No added salt  3. If diabetic, follow a diabetic diet and check glucose prior to meals or as instructed by your physician. Dietary Supplements:  []Eris  []30ml ProStat  []EnsureEnlive []Ensure Max/Premier  []Other:    If you are still having pain after you go home:   For wounds on lower legs or arms, elevate the affected limb.  Use over-the-counter medications you would normally use for pain as permitted by your primary care doctor.  For persistent pain not relieved by the above interventions, please call your primary care doctor. Return Appointment:   Return Appointment: With Dr Caitlyn Rubio  in  [address] [place] [address])  o Scheduled weekly until (Date):      [] Return Appointment for a Wound Assessment with a nurse on:     [x]DME/Wound Dressing Supplies ordered at this visit: []Yes [x]No  o Supplies Provided by:   o Please call them directly to reorder supplies when you run out.  o CONTINUE TO USE THE SUPPLIES YOU HAVE AVAILABLE. IT IS MOST IMPORTANT TO KEEP THE WOUND COVERED AT ALL TIMES.     [x] Orders placed during your visit:   Orders Placed This Encounter   Procedures    Initiate Outpatient Wound Care Protocol    VL Reflux Ector Insufficiency Stdy Left       Your nurse  is:  Merrell Seip     Electronically signed by Manju Arriaga RN on 5/11/2021 at [address] [facility] Information: Should you experience any significant changes in your wound(s) or have questions about your wound care,

## 2021-05-11 NOTE — PROGRESS NOTES
[employer] Application   Below Knee    NAME:  Prabhakar Rowe  YOB: 1955  MEDICAL RECORD NUMBER:  [id number]  DATE:  5/11/2021       Applied moisturizing agent to dry skin as needed.  Applied primary and secondary dressing as ordered.  Applied [place] roll from base of toes to just below the knee overlapping each time.  Applied coban from base of toes to just below the knee.  Secured by pressing down on the coban layer and using paper tape as needed.  Instructed patient/caregiver to keep dressing dry and intact. DO NOT REMOVE DRESSING.  Instructed pt/family/caregiver to report excessive draining, loose bandage, wet dressing, severe pain or tingling in toes.    Unna Boot was applied to: [x] Left Lower Leg [] Right Lower Leg

## 2021-05-18 ENCOUNTER — HOSPITAL ENCOUNTER (OUTPATIENT)
Dept: WOUND CARE | Age: 66
Discharge: HOME | End: 2021-05-18
Payer: COMMERCIAL

## 2021-05-18 VITALS
DIASTOLIC BLOOD PRESSURE: 75 MMHG | HEART RATE: 76 BPM | RESPIRATION RATE: 18 BRPM | TEMPERATURE: 98.2 F | SYSTOLIC BLOOD PRESSURE: 131 MMHG

## 2021-05-18 DIAGNOSIS — L03.116 CELLULITIS OF LEFT LEG: Primary | ICD-10-CM

## 2021-05-18 PROCEDURE — 29580 STRAPPING UNNA BOOT: CPT

## 2021-05-18 RX ORDER — BETAMETHASONE DIPROPIONATE 0.05 %
OINTMENT (GRAM) TOPICAL ONCE
Status: CANCELLED | OUTPATIENT
Start: 2021-05-18 | End: 2021-05-18

## 2021-05-18 RX ORDER — LIDOCAINE HYDROCHLORIDE 40 MG/ML
SOLUTION TOPICAL ONCE
Status: CANCELLED | OUTPATIENT
Start: 2021-05-18 | End: 2021-05-18

## 2021-05-18 RX ORDER — GENTAMICIN SULFATE 1 MG/G
OINTMENT TOPICAL ONCE
Status: CANCELLED | OUTPATIENT
Start: 2021-05-18 | End: 2021-05-18

## 2021-05-18 RX ORDER — CLOBETASOL PROPIONATE 0.5 MG/G
OINTMENT TOPICAL ONCE
Status: CANCELLED | OUTPATIENT
Start: 2021-05-18 | End: 2021-05-18

## 2021-05-18 RX ORDER — BACITRACIN ZINC AND POLYMYXIN B SULFATE 500; 1000 [USP'U]/G; [USP'U]/G
OINTMENT TOPICAL ONCE
Status: CANCELLED | OUTPATIENT
Start: 2021-05-18 | End: 2021-05-18

## 2021-05-18 RX ORDER — LIDOCAINE HYDROCHLORIDE 20 MG/ML
JELLY TOPICAL ONCE
Status: CANCELLED | OUTPATIENT
Start: 2021-05-18 | End: 2021-05-18

## 2021-05-18 RX ORDER — BACITRACIN, NEOMYCIN, POLYMYXIN B 400; 3.5; 5 [USP'U]/G; MG/G; [USP'U]/G
OINTMENT TOPICAL ONCE
Status: CANCELLED | OUTPATIENT
Start: 2021-05-18 | End: 2021-05-18

## 2021-05-18 RX ORDER — GINSENG 100 MG
CAPSULE ORAL ONCE
Status: CANCELLED | OUTPATIENT
Start: 2021-05-18 | End: 2021-05-18

## 2021-05-18 RX ORDER — LIDOCAINE 50 MG/G
OINTMENT TOPICAL ONCE
Status: CANCELLED | OUTPATIENT
Start: 2021-05-18 | End: 2021-05-18

## 2021-05-18 RX ORDER — LIDOCAINE 40 MG/G
CREAM TOPICAL ONCE
Status: CANCELLED | OUTPATIENT
Start: 2021-05-18 | End: 2021-05-18

## 2021-05-18 RX ORDER — LIDOCAINE HYDROCHLORIDE 40 MG/ML
SOLUTION TOPICAL ONCE
Status: DISCONTINUED | OUTPATIENT
Start: 2021-05-18 | End: 2021-05-19 | Stop reason: HOSPADM

## 2021-05-18 ASSESSMENT — PAIN SCALES - GENERAL: PAINLEVEL_OUTOF10: 2

## 2021-05-18 ASSESSMENT — PAIN DESCRIPTION - PAIN TYPE: TYPE: ACUTE PAIN

## 2021-05-18 ASSESSMENT — PAIN DESCRIPTION - ORIENTATION: ORIENTATION: LEFT

## 2021-05-18 NOTE — PROGRESS NOTES
[employer] Application   Below Knee    NAME:  Molly Berkowitz  YOB: 1955  MEDICAL RECORD NUMBER:  [id number]  DATE:  5/18/2021       Applied moisturizing agent to dry skin as needed.  Applied primary and secondary dressing as ordered.  Applied [place] roll from base of toes to just below the knee overlapping each time.  Applied coban from base of toes to just below the knee.  Secured by pressing down on the coban layer and using paper tape as needed.  Instructed patient/caregiver to keep dressing dry and intact. DO NOT REMOVE DRESSING.  Instructed pt/family/caregiver to report excessive draining, loose bandage, wet dressing, severe pain or tingling in toes.    Unna Boot was applied to: [x] Left Lower Leg [] Right Lower Leg

## 2021-05-20 NOTE — PROGRESS NOTES
[address]   Progress Note and Procedure Note      Rocio Hallman  MEDICAL RECORD NUMBER:  [id number]  AGE: 60 y.o. GENDER: male  : 1955  EPISODE DATE:  2021    Subjective:     Chief Complaint   Patient presents with    Wound Check     left leg         HISTORY of PRESENT ILLNESS HPI     Rocio Hallman is a 60 y.o. male who presents today for wound/ulcer evaluation. History of Wound Context: Patient presents today for further evaluation management of a left lower extremity cellulitis. Patient has a prolonged history of lower extremity redness and swelling with a history of a DVT. Patient relates he is on Eliquis but during the Covid pandemic there were times that he was unable to get his medication. Patient kept his compression dressing clean dry and intact. He did not however follow-up with getting his venous duplex as recommended citing cost of the test.  Wound/Ulcer Pain Timing/Severity: intermittent, moderate  Quality of pain: burning  Severity:  3 / 10   Modifying Factors: None  Associated Signs/Symptoms: edema, erythema and drainage    Ulcer Identification:  Ulcer Type: venous    Contributing Factors: edema, venous stasis and DVT    Acute Wound: N/A    PAST MEDICAL HISTORY        Diagnosis Date    Atopic dermatitis     Carpal tunnel syndrome     DVT (deep venous thrombosis) (HCC)     Hx of blood clots        PAST SURGICAL HISTORY    Past Surgical History:   Procedure Laterality Date    KNEE SURGERY Left        FAMILY HISTORY    History reviewed. No pertinent family history.     SOCIAL HISTORY    Social History     Tobacco Use    Smoking status: Never Smoker    Smokeless tobacco: Never Used   Vaping Use    Vaping Use: Never used   Substance Use Topics    Alcohol use: Not on file    Drug use: Never       ALLERGIES    Allergies   Allergen Reactions    Penicillins      Unknown what reaction    Vancomycin Other (See Comments)     [place] aspect of the foot. Patient's muscle strength for dorsiflexors plantar flexors inverters and everters is intact and symmetrical bilaterally. Assessment:        Problem List Items Addressed This Visit     Cellulitis of left leg - Primary               Plan:   E/M x30 minutes of a problem of left lower extremity venous stasis dermatitis. Prolonged discussion with patient that as the area is not warm it is just red and swollen it is likely an exacerbation of his dermatitis complicated by his venous stasis disease. Discussed with patient treatment options and recommend a venous reflux study to assess for incompetent vessels as this is likely contributing to his chronic swelling. Patient relates he wears compression socks but presents today just wearing long black socks. Patient refuses further vascular testing citing cost.  Encourage patient to follow-up with getting his reflux study as recurrent bouts of \"cellulitis\" can ultimately lead to a worsening infection with antibiotic resistance. If patient follows up with getting his reflux study and it is abnormal will further refer patient to Dr. Christine Haji for further evaluation and management. A multilayer compression dressing with zinc oxide paste was applied to the left lower extremity. Patient was instructed to keep it intact x1 week. Prolonged discussion with patient that if his symptoms improved with compression infection is not the underlying etiology but rather chronic venous disease. Patient was instructed to keep the area clean dry and intact. Prolonged discussion with patient that as there has been significant improvement in his edema and redness without additional antibiotics this is likely due to poorly controlled venous disease as opposed to infection. Encouraged patient to follow-up with getting his venous duplex for long-term management of his swelling.     Treatment Note please see attached Discharge Instructions    Written patient dismissal instructions given to patient and signed by patient or POA. Reappoint 1 week for follow-up    Discharge [facility] Physician Orders and Discharge Instructions  [address] [address] [address] [address] [facility]. [facility]  Telephone: [phone] hands with soap and water prior to and after every dressing change.     Wound Cleansing: (All wounds are cleaned with 0.9% saline during your wound care visit)   · Keep wounds dry in the shower unless otherwise instructed by the physician. · For wounds on lower legs, cast covers can be purchased at local drug stores, so that you may shower and keep the wound(s) dry.        [place] wound Topical Treatments:  Do not apply lotions, creams, or ointments to the skin around the wound bed unless directed as followed:      []? Apply around the wound:   []? moisturizing lotion  []? Antifungal ointment            []? No-Sting barrier film   []? Zinc paste  []? Other:                   Edema Control:  Apply:  [x]? Compression Stocking       []? Left Lower Leg      [x]? Right Lower Leg                            · Apply every morning immediately when getting up. They should be applied to affected leg(s) from mid foot to knee making sure to cover the heel. Remove every night before going to bed.                  [x]? Elevate leg(s) above the level of the heart for 30 minutes 4-5 times a day and/or when sitting. [x]? Avoid prolonged standing in one place.            Multilayer Compression Wrap:                     Type: Zinc Unna Boot   · Applied in Clinic to the :  Left lower leg(s) on 5/18/2021  · Do not get leg(s) with wrap wet. · If wraps become too tight call the center or completely remove the wrap. · Elevate leg(s) above the level of the heart when sitting. · Avoid prolonged standing in one place.         Dietary:  Important dietary reminders:  1.  Increase Protein intake (i.e. Lean meats, fish, eggs, legumes, and yogurt)  2. No added salt  3. If diabetic, follow a diabetic diet and check glucose prior to meals or as instructed by your physician.     Dietary Supplements:  []? Eris                      []?30ml ProStat  []? EnsureEnlive          []? Ensure Max/Premier  []? Other:     If you are still having pain after you go home:  · For wounds on lower legs or arms, elevate the affected limb. · Use over-the-counter medications you would normally use for pain as permitted by your primary care doctor. · For persistent pain not relieved by the above interventions, please call your primary care doctor.      Return Appointment:  · Return Appointment: With Dr Little Salguero  in  [address] [place] [address])  ? Scheduled weekly until (Date):      [] Return Appointment for a Wound Assessment with a nurse on:     []DME/Wound Dressing Supplies ordered at this visit: []Yes []No  o Supplies Provided by:   o Please call them directly to reorder supplies when you run out.  o CONTINUE TO USE THE SUPPLIES YOU HAVE AVAILABLE. IT IS MOST IMPORTANT TO KEEP THE WOUND COVERED AT ALL TIMES. [x] Orders placed during your visit:   Orders Placed This Encounter   Procedures    Initiate Outpatient Wound Care Protocol       Your nurse  is:  Nathalia Varner     Electronically signed by Zeke Boss RN on 5/18/2021 at 2:12 PM     [address] Information: Should you experience any significant changes in your wound(s) or have questions about your wound care, please contact the [address] [place] [address] at [phone]. We are open from 8:00am - 4:30p Monday thru Friday except for Wednesdays which we are closed. Please give us 24-48 hours to return your call. Call your doctor now or seek immediate medical care if:    · You have symptoms of infection, such as:  ? Increased pain, swelling, warmth, or redness. ? Red streaks leading from the area. ? Pus draining from the area. ? A fever.         Physician for this visit and orders: Thi Doss DPM    [] Patient unable to sign Discharge Instructions given to ECF/Transportation/POA          Electronically signed by Thi Doss DPM on 5/20/2021 at 9:12 AM

## 2021-05-25 ENCOUNTER — HOSPITAL ENCOUNTER (OUTPATIENT)
Dept: WOUND CARE | Age: 66
Discharge: HOME | End: 2021-05-25
Payer: COMMERCIAL

## 2021-05-25 VITALS
TEMPERATURE: 97.7 F | HEART RATE: 73 BPM | RESPIRATION RATE: 18 BRPM | SYSTOLIC BLOOD PRESSURE: 124 MMHG | DIASTOLIC BLOOD PRESSURE: 74 MMHG

## 2021-05-25 DIAGNOSIS — L03.116 CELLULITIS OF LEFT LEG: ICD-10-CM

## 2021-05-25 PROCEDURE — 99212 OFFICE O/P EST SF 10 MIN: CPT

## 2021-05-25 ASSESSMENT — PAIN DESCRIPTION - DESCRIPTORS: DESCRIPTORS: OTHER (COMMENT)

## 2021-05-25 ASSESSMENT — PAIN SCALES - GENERAL: PAINLEVEL_OUTOF10: 2

## 2021-05-25 NOTE — PROGRESS NOTES
Izzy syndrome with Vanc 3/9/20 - tolerates with slower infusion rate       MEDICATIONS    Current Outpatient Medications on File Prior to Encounter   Medication Sig Dispense Refill    apixaban (ELIQUIS) 5 MG TABS tablet Take 1 tablet by mouth 2 times daily Start with 10mg twice daily for 10 days then 5mg twice daily for 6 months 74 tablet 5     No current facility-administered medications on file prior to encounter. REVIEW OF SYSTEMS  Review of Systems    Pertinent items are noted in HPI. Review of Systems: A 12 point review of symptoms is unremarkable with the exception of the chief complaint. Patient specifically denies nausea, fever, vomiting, chills, shortness of breath, chest pain, abdominal pain, constipation or difficulty urinating. Objective:      /74   Pulse 73   Temp 97.7 °F (36.5 °C) (Temporal)   Resp 18     Wt Readings from Last 3 Encounters:   05/11/21 194 lb 0.1 oz (88 kg)   05/11/21 196 lb (88.9 kg)   04/29/21 201 lb (91.2 kg)       PHYSICAL EXAM  Physical Exam    DP/PT pulses palpable bilaterally. CFT brisk to all digits. Digits are pink and warm to the touch. Hair growth is normal in appearance. Significant edema noted left lower extremity when compared to the right that has improved with compression. There is no warmth associated with this swelling but there is redness, which has also improved with compression. No calf pain with palpation noted on the right and there is pain on the left. Epicritic sensation is grossly intact bilaterally. Negative clonus and babinski reflex is down going. Patient has diffuse erythematous plaques noted on his bilateral lower extremities and upper extremities. The plaques on the left are larger with more intense erythema. The previously noted excoriated areas have epithelialized. Patient's muscle strength for dorsiflexors plantar flexors inverters and everters is intact and symmetrical bilaterally.         Assessment: Problem List Items Addressed This Visit     Cellulitis of left leg               Plan:   E/M x30 minutes of a problem of left lower extremity venous stasis dermatitis. Prolonged discussion with patient that as the area is not warm it is just red and swollen it is likely an exacerbation of his dermatitis complicated by his venous stasis disease. Discussed with patient treatment options and recommend a venous reflux study to assess for incompetent vessels as this is likely contributing to his chronic swelling. Patient refuses further vascular testing citing cost (again). Encourage patient to follow-up with getting his reflux study as recurrent bouts of \"cellulitis\" can ultimately lead to a worsening infection with antibiotic resistance. If patient follows up with getting his reflux study and it is abnormal will further refer patient to Dr. Travis Ramirez for further evaluation and management. Patient was instructed to resume wearing compression stockings. Prolonged discussion with patient that as there has been significant improvement in his edema and redness without additional antibiotics this is likely due to poorly controlled venous disease as opposed to infection. Encouraged patient to follow-up with getting his venous duplex for long-term management of his swelling. Treatment Note please see attached Discharge Instructions    Written patient dismissal instructions given to patient and signed by patient or POA. Reappoint prn recurrence of ulcerations. Discharge [address]   [address] [address] [facility]. [place]  Telephone: [phone]    NAME:  Dandre Josue OF BIRTH:  1955  MEDICAL RECORD NUMBER:  [id number]  DATE:  5/25/2021      Congratulations!  You have completed your treatment program!    To prevent Venous Wounds from recurring again:  · Elevate your legs at least parallel to the ground while sitting. · Wear your prescription support stockings everyday and remove at night while you sleep. · Replace your stockings every 3-6 months so that your legs continue to get the support they need. · Inspect your legs and feet daily and report any increased swelling, unusual redness or new wounds to your physician. · Wash your legs and feet regularly with mild soap and water and moisturize daily. · Continue to use compression pumps if prescribed by your physician. · Avoid overeating. Extra weight adds pressure on the veins in your legs. · Limit salty foods as they promote swelling or fluid retention in your legs. Additional instructions for healed wound/skin care: A High Spandagrip was applied to your Left Lower Leg until you can apply your own compression stockings    Call the [facility] [address] if your wound reopens, if new wounds occur, or if you have any other questions about your follow up care [phone].      [] Patient unable to sign Discharge Instructions given to ECF/Transportation/POA    Electronically signed by Margo Crandall RN on 5/25/2021 at 1:52 PM          Electronically signed by Cecille Flores DPM on 5/25/2021 at 2:55 PM